# Patient Record
Sex: OTHER/UNKNOWN | URBAN - METROPOLITAN AREA
[De-identification: names, ages, dates, MRNs, and addresses within clinical notes are randomized per-mention and may not be internally consistent; named-entity substitution may affect disease eponyms.]

---

## 2023-01-01 ENCOUNTER — HOSPITAL ENCOUNTER (INPATIENT)
Facility: HOSPITAL | Age: 73
LOS: 1 days | DRG: 871 | End: 2023-12-28
Attending: STUDENT IN AN ORGANIZED HEALTH CARE EDUCATION/TRAINING PROGRAM | Admitting: STUDENT IN AN ORGANIZED HEALTH CARE EDUCATION/TRAINING PROGRAM

## 2023-01-01 VITALS
DIASTOLIC BLOOD PRESSURE: 56 MMHG | WEIGHT: 275 LBS | SYSTOLIC BLOOD PRESSURE: 116 MMHG | TEMPERATURE: 88 F | RESPIRATION RATE: 14 BRPM | OXYGEN SATURATION: 62 %

## 2023-01-01 DIAGNOSIS — K72.01 ACUTE LIVER FAILURE WITH HEPATIC COMA: ICD-10-CM

## 2023-01-01 DIAGNOSIS — R41.89 UNRESPONSIVE: ICD-10-CM

## 2023-01-01 DIAGNOSIS — R00.0 TACHYCARDIA: ICD-10-CM

## 2023-01-01 DIAGNOSIS — I74.10 AORTIC THROMBUS: ICD-10-CM

## 2023-01-01 DIAGNOSIS — Z01.818 ENCOUNTER FOR INTUBATION: ICD-10-CM

## 2023-01-01 DIAGNOSIS — E87.20 METABOLIC ACIDOSIS: ICD-10-CM

## 2023-01-01 DIAGNOSIS — R57.9 SHOCK: ICD-10-CM

## 2023-01-01 DIAGNOSIS — I26.99 ACUTE PULMONARY EMBOLISM, UNSPECIFIED PULMONARY EMBOLISM TYPE, UNSPECIFIED WHETHER ACUTE COR PULMONALE PRESENT: ICD-10-CM

## 2023-01-01 DIAGNOSIS — I71.02 DISSECTION OF ABDOMINAL AORTA: ICD-10-CM

## 2023-01-01 DIAGNOSIS — I46.9 CARDIAC ARREST: Primary | ICD-10-CM

## 2023-01-01 LAB
ABO + RH BLD: NORMAL
ABO GROUP BLD: NORMAL
ALBUMIN SERPL BCP-MCNC: 1 G/DL (ref 3.5–5.2)
ALLENS TEST: ABNORMAL
ALP SERPL-CCNC: 279 U/L (ref 55–135)
ALT SERPL W/O P-5'-P-CCNC: 515 U/L (ref 10–44)
AMMONIA PLAS-SCNC: 458 UMOL/L (ref 10–50)
ANION GAP SERPL CALC-SCNC: 24 MMOL/L (ref 8–16)
ANISOCYTOSIS BLD QL SMEAR: SLIGHT
APAP SERPL-MCNC: <3 UG/ML (ref 10–20)
APTT PPP: >150 SEC (ref 21–32)
AST SERPL-CCNC: 851 U/L (ref 10–40)
AV INDEX (PROSTH): 0.83
AV MEAN GRADIENT: 4 MMHG
AV PEAK GRADIENT: 10 MMHG
AV VALVE AREA BY VELOCITY RATIO: 2.59 CM²
AV VALVE AREA: 2.82 CM²
AV VELOCITY RATIO: 0.76
BASOPHILS NFR BLD: 0 % (ref 0–1.9)
BASOPHILS NFR BLD: 1 % (ref 0–1.9)
BILIRUB DIRECT SERPL-MCNC: 8 MG/DL (ref 0.1–0.3)
BILIRUB SERPL-MCNC: 13.9 MG/DL (ref 0.1–1)
BLD GP AB SCN CELLS X3 SERPL QL: NORMAL
BLD GP AB SCN CELLS X3 SERPL QL: NORMAL
BLD PROD TYP BPU: NORMAL
BLOOD UNIT EXPIRATION DATE: NORMAL
BLOOD UNIT TYPE CODE: 6200
BLOOD UNIT TYPE CODE: 9500
BLOOD UNIT TYPE CODE: 9500
BLOOD UNIT TYPE: NORMAL
BNP SERPL-MCNC: 149 PG/ML (ref 0–99)
BUN SERPL-MCNC: 36 MG/DL (ref 8–23)
BUN SERPL-MCNC: 42 MG/DL (ref 6–30)
BURR CELLS BLD QL SMEAR: ABNORMAL
CA-I BLDV-SCNC: 1.03 MMOL/L (ref 1.06–1.42)
CALCIUM SERPL-MCNC: 9.5 MG/DL (ref 8.7–10.5)
CHLORIDE SERPL-SCNC: 107 MMOL/L (ref 95–110)
CHLORIDE SERPL-SCNC: 107 MMOL/L (ref 95–110)
CK SERPL-CCNC: 511 U/L (ref 20–200)
CO2 SERPL-SCNC: 6 MMOL/L (ref 23–29)
CODING SYSTEM: NORMAL
CREAT SERPL-MCNC: 1.7 MG/DL (ref 0.5–1.4)
CREAT SERPL-MCNC: 2 MG/DL (ref 0.5–1.4)
CROSSMATCH INTERPRETATION: NORMAL
CV ECHO LV RWT: 0.35 CM
DACRYOCYTES BLD QL SMEAR: ABNORMAL
DIFFERENTIAL METHOD: ABNORMAL
DISPENSE STATUS: NORMAL
DOHLE BOD BLD QL SMEAR: PRESENT
DOHLE BOD BLD QL SMEAR: PRESENT
DOP CALC AO PEAK VEL: 1.6 M/S
DOP CALC AO VTI: 32.76 CM
DOP CALC LVOT AREA: 3.4 CM2
DOP CALC LVOT DIAMETER: 2.08 CM
DOP CALC LVOT PEAK VEL: 1.22 M/S
DOP CALC LVOT STROKE VOLUME: 92.34 CM3
DOP CALCLVOT PEAK VEL VTI: 27.19 CM
E WAVE DECELERATION TIME: 375.19 MSEC
E/A RATIO: 0.83
E/E' RATIO: 10.91 M/S
ECHO LV POSTERIOR WALL: 0.95 CM (ref 0.6–1.1)
EOSINOPHIL NFR BLD: 0 % (ref 0–8)
EOSINOPHIL NFR BLD: 0 % (ref 0–8)
EOSINOPHIL NFR BLD: 1 % (ref 0–8)
EOSINOPHIL NFR BLD: 1 % (ref 0–8)
ERYTHROCYTE [DISTWIDTH] IN BLOOD BY AUTOMATED COUNT: 20.3 % (ref 11.5–14.5)
ERYTHROCYTE [DISTWIDTH] IN BLOOD BY AUTOMATED COUNT: 24.6 % (ref 11.5–14.5)
ERYTHROCYTE [DISTWIDTH] IN BLOOD BY AUTOMATED COUNT: 29.2 % (ref 11.5–14.5)
ERYTHROCYTE [DISTWIDTH] IN BLOOD BY AUTOMATED COUNT: 29.2 % (ref 11.5–14.5)
EST. GFR  (NO RACE VARIABLE): 42.6 ML/MIN/1.73 M^2
ETHANOL SERPL-MCNC: <10 MG/DL
FIBRINOGEN PPP-MCNC: <70 MG/DL (ref 182–400)
FIBRINOGEN PPP-MCNC: <70 MG/DL (ref 182–400)
FRACTIONAL SHORTENING: 29 % (ref 28–44)
GLUCOSE SERPL-MCNC: 125 MG/DL (ref 70–110)
GLUCOSE SERPL-MCNC: 165 MG/DL (ref 70–110)
HAPTOGLOB SERPL-MCNC: 21 MG/DL (ref 30–250)
HCO3 UR-SCNC: 5.2 MMOL/L (ref 24–28)
HCO3 UR-SCNC: 7.3 MMOL/L (ref 24–28)
HCO3 UR-SCNC: 7.8 MMOL/L (ref 24–28)
HCT VFR BLD AUTO: 10.6 % (ref 40–54)
HCT VFR BLD AUTO: 11.6 % (ref 40–54)
HCT VFR BLD AUTO: 14.6 % (ref 40–54)
HCT VFR BLD AUTO: 20.2 % (ref 40–54)
HCT VFR BLD CALC: 27 %PCV (ref 36–54)
HCV AB SERPL QL IA: NORMAL
HGB BLD-MCNC: 3.1 G/DL (ref 14–18)
HGB BLD-MCNC: 3.4 G/DL (ref 14–18)
HGB BLD-MCNC: 4.6 G/DL (ref 14–18)
HGB BLD-MCNC: 5.9 G/DL (ref 14–18)
HIV 1+2 AB+HIV1 P24 AG SERPL QL IA: NORMAL
HYPOCHROMIA BLD QL SMEAR: ABNORMAL
IMM GRANULOCYTES # BLD AUTO: ABNORMAL K/UL (ref 0–0.04)
IMM GRANULOCYTES NFR BLD AUTO: ABNORMAL % (ref 0–0.5)
INR PPP: 2.4 (ref 0.8–1.2)
INR PPP: 3.2 (ref 0.8–1.2)
INR PPP: 3.5 (ref 0.8–1.2)
INTERVENTRICULAR SEPTUM: 1.09 CM (ref 0.6–1.1)
IVRT: 156.04 MSEC
LA MAJOR: 4.67 CM
LA MINOR: 4.48 CM
LA WIDTH: 4.04 CM
LACTATE SERPL-SCNC: >12 MMOL/L (ref 0.5–2.2)
LACTATE SERPL-SCNC: >12 MMOL/L (ref 0.5–2.2)
LDH SERPL L TO P-CCNC: 18.14 MMOL/L (ref 0.5–2.2)
LDH SERPL L TO P-CCNC: 18.97 MMOL/L (ref 0.5–2.2)
LDH SERPL L TO P-CCNC: 5325 U/L (ref 110–260)
LEFT ATRIUM SIZE: 3.23 CM
LEFT ATRIUM VOLUME MOD: 60.29 CM3
LEFT ATRIUM VOLUME: 50.72 CM3
LEFT INTERNAL DIMENSION IN SYSTOLE: 3.85 CM (ref 2.1–4)
LEFT VENTRICLE DIASTOLIC VOLUME: 128.34 ML
LEFT VENTRICLE SYSTOLIC VOLUME: 63.78 ML
LEFT VENTRICULAR INTERNAL DIMENSION IN DIASTOLE: 5.4 CM (ref 3.5–6)
LEFT VENTRICULAR MASS: 212.23 G
LIPASE SERPL-CCNC: 37 U/L (ref 4–60)
LV LATERAL E/E' RATIO: 8.57 M/S
LV SEPTAL E/E' RATIO: 15 M/S
LYMPHOCYTES NFR BLD: 13 % (ref 18–48)
LYMPHOCYTES NFR BLD: 24 % (ref 18–48)
LYMPHOCYTES NFR BLD: 27 % (ref 18–48)
LYMPHOCYTES NFR BLD: 9 % (ref 18–48)
MCH RBC QN AUTO: 29.5 PG (ref 27–31)
MCH RBC QN AUTO: 29.9 PG (ref 27–31)
MCH RBC QN AUTO: 30.1 PG (ref 27–31)
MCH RBC QN AUTO: 31.2 PG (ref 27–31)
MCHC RBC AUTO-ENTMCNC: 29.2 G/DL (ref 32–36)
MCHC RBC AUTO-ENTMCNC: 29.2 G/DL (ref 32–36)
MCHC RBC AUTO-ENTMCNC: 29.3 G/DL (ref 32–36)
MCHC RBC AUTO-ENTMCNC: 31.5 G/DL (ref 32–36)
MCV RBC AUTO: 101 FL (ref 82–98)
MCV RBC AUTO: 103 FL (ref 82–98)
MCV RBC AUTO: 106 FL (ref 82–98)
MCV RBC AUTO: 95 FL (ref 82–98)
METAMYELOCYTES NFR BLD MANUAL: 4 %
METAMYELOCYTES NFR BLD MANUAL: 4 %
METAMYELOCYTES NFR BLD MANUAL: 6 %
METAMYELOCYTES NFR BLD MANUAL: 7 %
MONOCYTES NFR BLD: 0 % (ref 4–15)
MONOCYTES NFR BLD: 0 % (ref 4–15)
MONOCYTES NFR BLD: 1 % (ref 4–15)
MONOCYTES NFR BLD: 1 % (ref 4–15)
MV PEAK A VEL: 0.72 M/S
MV PEAK E VEL: 0.6 M/S
MV STENOSIS PRESSURE HALF TIME: 70.5 MS
MV VALVE AREA P 1/2 METHOD: 3.12 CM2
MYELOCYTES NFR BLD MANUAL: 4 %
MYELOCYTES NFR BLD MANUAL: 4 %
NEUTROPHILS NFR BLD: 54 % (ref 38–73)
NEUTROPHILS NFR BLD: 56 % (ref 38–73)
NEUTROPHILS NFR BLD: 72 % (ref 38–73)
NEUTROPHILS NFR BLD: 75 % (ref 38–73)
NEUTS BAND NFR BLD MANUAL: 11 %
NEUTS BAND NFR BLD MANUAL: 7 %
NEUTS BAND NFR BLD MANUAL: 9 %
NEUTS BAND NFR BLD MANUAL: 9 %
NRBC BLD-RTO: 5 /100 WBC
NRBC BLD-RTO: 7 /100 WBC
NRBC BLD-RTO: 7 /100 WBC
NRBC BLD-RTO: 8 /100 WBC
NUM UNITS TRANS PACKED RBC: NORMAL
NUM UNITS TRANS PACKED RBC: NORMAL
PATH REV BLD -IMP: NORMAL
PCO2 BLDA: 33.8 MMHG (ref 35–45)
PCO2 BLDA: 33.8 MMHG (ref 35–45)
PCO2 BLDA: 58.7 MMHG (ref 35–45)
PH SMN: 6.73 [PH] (ref 7.35–7.45)
PH SMN: 6.8 [PH] (ref 7.35–7.45)
PH SMN: 6.94 [PH] (ref 7.35–7.45)
PISA TR MAX VEL: 2.82 M/S
PLATELET # BLD AUTO: 214 K/UL (ref 150–450)
PLATELET # BLD AUTO: 234 K/UL (ref 150–450)
PLATELET # BLD AUTO: 247 K/UL (ref 150–450)
PLATELET # BLD AUTO: 288 K/UL (ref 150–450)
PLATELET BLD QL SMEAR: ABNORMAL
PMV BLD AUTO: 10 FL (ref 9.2–12.9)
PMV BLD AUTO: 10.5 FL (ref 9.2–12.9)
PMV BLD AUTO: 10.9 FL (ref 9.2–12.9)
PMV BLD AUTO: 9.7 FL (ref 9.2–12.9)
PO2 BLDA: 30 MMHG (ref 40–60)
PO2 BLDA: 65 MMHG (ref 40–60)
PO2 BLDA: 66 MMHG (ref 40–60)
POC BE: -25 MMOL/L
POC BE: -28 MMOL/L
POC BE: -29 MMOL/L
POC IONIZED CALCIUM: 1.09 MMOL/L (ref 1.06–1.42)
POC SATURATED O2: 30 % (ref 95–100)
POC SATURATED O2: 64 % (ref 95–100)
POC SATURATED O2: 68 % (ref 95–100)
POC TCO2 (MEASURED): 11 MMOL/L (ref 23–27)
POC TCO2: 10 MMOL/L (ref 24–29)
POC TCO2: 6 MMOL/L (ref 24–29)
POC TCO2: 8 MMOL/L (ref 24–29)
POCT GLUCOSE: >500 MG/DL (ref 70–110)
POIKILOCYTOSIS BLD QL SMEAR: ABNORMAL
POIKILOCYTOSIS BLD QL SMEAR: SLIGHT
POLYCHROMASIA BLD QL SMEAR: ABNORMAL
POTASSIUM BLD-SCNC: 4.4 MMOL/L (ref 3.5–5.1)
POTASSIUM SERPL-SCNC: 5.2 MMOL/L (ref 3.5–5.1)
PROT SERPL-MCNC: 2.5 G/DL (ref 6–8.4)
PROTHROMBIN TIME: 24.7 SEC (ref 9–12.5)
PROTHROMBIN TIME: 32.9 SEC (ref 9–12.5)
PROTHROMBIN TIME: 35 SEC (ref 9–12.5)
RBC # BLD AUTO: 1.03 M/UL (ref 4.6–6.2)
RBC # BLD AUTO: 1.09 M/UL (ref 4.6–6.2)
RBC # BLD AUTO: 1.54 M/UL (ref 4.6–6.2)
RBC # BLD AUTO: 2 M/UL (ref 4.6–6.2)
RETICS/RBC NFR AUTO: 9.6 % (ref 0.4–2)
RH BLD: NORMAL
RIGHT VENTRICULAR END-DIASTOLIC DIMENSION: 3.35 CM
SALICYLATES SERPL-MCNC: <5 MG/DL (ref 15–30)
SAMPLE: ABNORMAL
SCHISTOCYTES BLD QL SMEAR: ABNORMAL
SCHISTOCYTES BLD QL SMEAR: PRESENT
SINUS: 3.5 CM
SITE: ABNORMAL
SODIUM BLD-SCNC: 136 MMOL/L (ref 136–145)
SODIUM SERPL-SCNC: 137 MMOL/L (ref 136–145)
SPECIMEN OUTDATE: NORMAL
SPECIMEN OUTDATE: NORMAL
STJ: 2.9 CM
TDI LATERAL: 0.07 M/S
TDI SEPTAL: 0.04 M/S
TDI: 0.06 M/S
TOXIC GRANULES BLD QL SMEAR: PRESENT
TR MAX PG: 32 MMHG
TRANS ERYTHROCYTES VOL PATIENT: NORMAL ML
TRANS ERYTHROCYTES VOL PATIENT: NORMAL ML
TRICUSPID ANNULAR PLANE SYSTOLIC EXCURSION: 2.27 CM
TROPONIN I SERPL DL<=0.01 NG/ML-MCNC: 0.11 NG/ML (ref 0–0.03)
TROPONIN I SERPL DL<=0.01 NG/ML-MCNC: 0.61 NG/ML (ref 0–0.03)
TROPONIN I SERPL DL<=0.01 NG/ML-MCNC: 2.06 NG/ML (ref 0–0.03)
UNIT NUMBER: NORMAL
WBC # BLD AUTO: 11.86 K/UL (ref 3.9–12.7)
WBC # BLD AUTO: 21.88 K/UL (ref 3.9–12.7)
WBC # BLD AUTO: 21.96 K/UL (ref 3.9–12.7)
WBC # BLD AUTO: 22.55 K/UL (ref 3.9–12.7)
WBC TOXIC VACUOLES BLD QL SMEAR: PRESENT

## 2023-01-01 PROCEDURE — 86920 COMPATIBILITY TEST SPIN: CPT

## 2023-01-01 PROCEDURE — 83615 LACTATE (LD) (LDH) ENZYME: CPT

## 2023-01-01 PROCEDURE — 85007 BL SMEAR W/DIFF WBC COUNT: CPT | Performed by: STUDENT IN AN ORGANIZED HEALTH CARE EDUCATION/TRAINING PROGRAM

## 2023-01-01 PROCEDURE — 85007 BL SMEAR W/DIFF WBC COUNT: CPT | Mod: 91 | Performed by: INTERNAL MEDICINE

## 2023-01-01 PROCEDURE — 80143 DRUG ASSAY ACETAMINOPHEN: CPT

## 2023-01-01 PROCEDURE — P9016 RBC LEUKOCYTES REDUCED: HCPCS | Performed by: STUDENT IN AN ORGANIZED HEALTH CARE EDUCATION/TRAINING PROGRAM

## 2023-01-01 PROCEDURE — 25500020 PHARM REV CODE 255: Performed by: STUDENT IN AN ORGANIZED HEALTH CARE EDUCATION/TRAINING PROGRAM

## 2023-01-01 PROCEDURE — 63600175 PHARM REV CODE 636 W HCPCS: Performed by: STUDENT IN AN ORGANIZED HEALTH CARE EDUCATION/TRAINING PROGRAM

## 2023-01-01 PROCEDURE — 82140 ASSAY OF AMMONIA: CPT

## 2023-01-01 PROCEDURE — 36430 TRANSFUSION BLD/BLD COMPNT: CPT

## 2023-01-01 PROCEDURE — 25000003 PHARM REV CODE 250

## 2023-01-01 PROCEDURE — 85610 PROTHROMBIN TIME: CPT | Mod: 91

## 2023-01-01 PROCEDURE — 85384 FIBRINOGEN ACTIVITY: CPT | Performed by: STUDENT IN AN ORGANIZED HEALTH CARE EDUCATION/TRAINING PROGRAM

## 2023-01-01 PROCEDURE — 36620 INSERTION CATHETER ARTERY: CPT

## 2023-01-01 PROCEDURE — 82248 BILIRUBIN DIRECT: CPT

## 2023-01-01 PROCEDURE — P9021 RED BLOOD CELLS UNIT: HCPCS

## 2023-01-01 PROCEDURE — 80053 COMPREHEN METABOLIC PANEL: CPT | Performed by: STUDENT IN AN ORGANIZED HEALTH CARE EDUCATION/TRAINING PROGRAM

## 2023-01-01 PROCEDURE — 83605 ASSAY OF LACTIC ACID: CPT

## 2023-01-01 PROCEDURE — 99291 CRITICAL CARE FIRST HOUR: CPT | Mod: 25,,, | Performed by: INTERNAL MEDICINE

## 2023-01-01 PROCEDURE — 83605 ASSAY OF LACTIC ACID: CPT | Mod: 91 | Performed by: INTERNAL MEDICINE

## 2023-01-01 PROCEDURE — 84132 ASSAY OF SERUM POTASSIUM: CPT

## 2023-01-01 PROCEDURE — 82330 ASSAY OF CALCIUM: CPT

## 2023-01-01 PROCEDURE — 82803 BLOOD GASES ANY COMBINATION: CPT

## 2023-01-01 PROCEDURE — 84484 ASSAY OF TROPONIN QUANT: CPT

## 2023-01-01 PROCEDURE — 83010 ASSAY OF HAPTOGLOBIN QUANT: CPT

## 2023-01-01 PROCEDURE — 80179 DRUG ASSAY SALICYLATE: CPT

## 2023-01-01 PROCEDURE — 92950 HEART/LUNG RESUSCITATION CPR: CPT

## 2023-01-01 PROCEDURE — 84295 ASSAY OF SERUM SODIUM: CPT

## 2023-01-01 PROCEDURE — 93010 ELECTROCARDIOGRAM REPORT: CPT | Mod: ,,, | Performed by: INTERNAL MEDICINE

## 2023-01-01 PROCEDURE — 86920 COMPATIBILITY TEST SPIN: CPT | Performed by: STUDENT IN AN ORGANIZED HEALTH CARE EDUCATION/TRAINING PROGRAM

## 2023-01-01 PROCEDURE — 86900 BLOOD TYPING SEROLOGIC ABO: CPT | Performed by: STUDENT IN AN ORGANIZED HEALTH CARE EDUCATION/TRAINING PROGRAM

## 2023-01-01 PROCEDURE — 96368 THER/DIAG CONCURRENT INF: CPT

## 2023-01-01 PROCEDURE — P9035 PLATELET PHERES LEUKOREDUCED: HCPCS | Performed by: STUDENT IN AN ORGANIZED HEALTH CARE EDUCATION/TRAINING PROGRAM

## 2023-01-01 PROCEDURE — 99221 1ST HOSP IP/OBS SF/LOW 40: CPT | Mod: FS,,, | Performed by: SURGERY

## 2023-01-01 PROCEDURE — 36556 INSERT NON-TUNNEL CV CATH: CPT | Mod: RT

## 2023-01-01 PROCEDURE — 86901 BLOOD TYPING SEROLOGIC RH(D): CPT | Performed by: STUDENT IN AN ORGANIZED HEALTH CARE EDUCATION/TRAINING PROGRAM

## 2023-01-01 PROCEDURE — 96365 THER/PROPH/DIAG IV INF INIT: CPT | Mod: 59

## 2023-01-01 PROCEDURE — 85007 BL SMEAR W/DIFF WBC COUNT: CPT | Mod: 91 | Performed by: STUDENT IN AN ORGANIZED HEALTH CARE EDUCATION/TRAINING PROGRAM

## 2023-01-01 PROCEDURE — 85027 COMPLETE CBC AUTOMATED: CPT | Mod: 91 | Performed by: STUDENT IN AN ORGANIZED HEALTH CARE EDUCATION/TRAINING PROGRAM

## 2023-01-01 PROCEDURE — 93010 EKG 12-LEAD: ICD-10-PCS | Mod: ,,, | Performed by: INTERNAL MEDICINE

## 2023-01-01 PROCEDURE — 20000000 HC ICU ROOM

## 2023-01-01 PROCEDURE — 87040 BLOOD CULTURE FOR BACTERIA: CPT | Mod: 59 | Performed by: STUDENT IN AN ORGANIZED HEALTH CARE EDUCATION/TRAINING PROGRAM

## 2023-01-01 PROCEDURE — 94761 N-INVAS EAR/PLS OXIMETRY MLT: CPT | Mod: XB

## 2023-01-01 PROCEDURE — 85730 THROMBOPLASTIN TIME PARTIAL: CPT

## 2023-01-01 PROCEDURE — 99221 PR INITIAL HOSPITAL CARE,LEVL I: ICD-10-PCS | Mod: FS,,, | Performed by: SURGERY

## 2023-01-01 PROCEDURE — 93005 ELECTROCARDIOGRAM TRACING: CPT

## 2023-01-01 PROCEDURE — 37799 UNLISTED PX VASCULAR SURGERY: CPT

## 2023-01-01 PROCEDURE — 85610 PROTHROMBIN TIME: CPT

## 2023-01-01 PROCEDURE — 82800 BLOOD PH: CPT

## 2023-01-01 PROCEDURE — 83880 ASSAY OF NATRIURETIC PEPTIDE: CPT

## 2023-01-01 PROCEDURE — 99900026 HC AIRWAY MAINTENANCE (STAT)

## 2023-01-01 PROCEDURE — 25000003 PHARM REV CODE 250: Performed by: STUDENT IN AN ORGANIZED HEALTH CARE EDUCATION/TRAINING PROGRAM

## 2023-01-01 PROCEDURE — 99285 EMERGENCY DEPT VISIT HI MDM: CPT | Mod: 25

## 2023-01-01 PROCEDURE — 99223 1ST HOSP IP/OBS HIGH 75: CPT | Mod: FS,,, | Performed by: STUDENT IN AN ORGANIZED HEALTH CARE EDUCATION/TRAINING PROGRAM

## 2023-01-01 PROCEDURE — 86901 BLOOD TYPING SEROLOGIC RH(D): CPT | Mod: 91 | Performed by: STUDENT IN AN ORGANIZED HEALTH CARE EDUCATION/TRAINING PROGRAM

## 2023-01-01 PROCEDURE — 51702 INSERT TEMP BLADDER CATH: CPT

## 2023-01-01 PROCEDURE — 83690 ASSAY OF LIPASE: CPT

## 2023-01-01 PROCEDURE — 85027 COMPLETE CBC AUTOMATED: CPT | Performed by: STUDENT IN AN ORGANIZED HEALTH CARE EDUCATION/TRAINING PROGRAM

## 2023-01-01 PROCEDURE — 86803 HEPATITIS C AB TEST: CPT | Performed by: STUDENT IN AN ORGANIZED HEALTH CARE EDUCATION/TRAINING PROGRAM

## 2023-01-01 PROCEDURE — 85027 COMPLETE CBC AUTOMATED: CPT | Mod: 91 | Performed by: INTERNAL MEDICINE

## 2023-01-01 PROCEDURE — 99238 HOSP IP/OBS DSCHRG MGMT 30/<: CPT | Mod: ,,, | Performed by: NURSE PRACTITIONER

## 2023-01-01 PROCEDURE — 94002 VENT MGMT INPAT INIT DAY: CPT

## 2023-01-01 PROCEDURE — 99238 PR HOSPITAL DISCHARGE DAY,<30 MIN: ICD-10-PCS | Mod: ,,, | Performed by: NURSE PRACTITIONER

## 2023-01-01 PROCEDURE — 63600175 PHARM REV CODE 636 W HCPCS

## 2023-01-01 PROCEDURE — 99900035 HC TECH TIME PER 15 MIN (STAT)

## 2023-01-01 PROCEDURE — 82077 ASSAY SPEC XCP UR&BREATH IA: CPT

## 2023-01-01 PROCEDURE — 27100171 HC OXYGEN HIGH FLOW UP TO 24 HOURS

## 2023-01-01 PROCEDURE — 31500 INSERT EMERGENCY AIRWAY: CPT

## 2023-01-01 PROCEDURE — 85384 FIBRINOGEN ACTIVITY: CPT | Mod: 91 | Performed by: INTERNAL MEDICINE

## 2023-01-01 PROCEDURE — C9113 INJ PANTOPRAZOLE SODIUM, VIA: HCPCS | Performed by: STUDENT IN AN ORGANIZED HEALTH CARE EDUCATION/TRAINING PROGRAM

## 2023-01-01 PROCEDURE — 85014 HEMATOCRIT: CPT

## 2023-01-01 PROCEDURE — 84484 ASSAY OF TROPONIN QUANT: CPT | Mod: 91

## 2023-01-01 PROCEDURE — 99291 PR CRITICAL CARE, E/M 30-74 MINUTES: ICD-10-PCS | Mod: 25,,, | Performed by: INTERNAL MEDICINE

## 2023-01-01 PROCEDURE — P9017 PLASMA 1 DONOR FRZ W/IN 8 HR: HCPCS | Performed by: STUDENT IN AN ORGANIZED HEALTH CARE EDUCATION/TRAINING PROGRAM

## 2023-01-01 PROCEDURE — 83036 HEMOGLOBIN GLYCOSYLATED A1C: CPT | Performed by: STUDENT IN AN ORGANIZED HEALTH CARE EDUCATION/TRAINING PROGRAM

## 2023-01-01 PROCEDURE — 87389 HIV-1 AG W/HIV-1&-2 AB AG IA: CPT | Performed by: STUDENT IN AN ORGANIZED HEALTH CARE EDUCATION/TRAINING PROGRAM

## 2023-01-01 PROCEDURE — 99223 PR INITIAL HOSPITAL CARE,LEVL III: ICD-10-PCS | Mod: FS,,, | Performed by: STUDENT IN AN ORGANIZED HEALTH CARE EDUCATION/TRAINING PROGRAM

## 2023-01-01 PROCEDURE — 82550 ASSAY OF CK (CPK): CPT | Performed by: NURSE PRACTITIONER

## 2023-01-01 PROCEDURE — 96366 THER/PROPH/DIAG IV INF ADDON: CPT

## 2023-01-01 PROCEDURE — 85045 AUTOMATED RETICULOCYTE COUNT: CPT

## 2023-01-01 PROCEDURE — 63600175 PHARM REV CODE 636 W HCPCS: Performed by: INTERNAL MEDICINE

## 2023-01-01 RX ORDER — SODIUM BICARBONATE 1 MEQ/ML
SYRINGE (ML) INTRAVENOUS CODE/TRAUMA/SEDATION MEDICATION
Status: COMPLETED | OUTPATIENT
Start: 2023-01-01 | End: 2023-01-01

## 2023-01-01 RX ORDER — HYDROCODONE BITARTRATE AND ACETAMINOPHEN 500; 5 MG/1; MG/1
TABLET ORAL CONTINUOUS
Status: DISCONTINUED | OUTPATIENT
Start: 2023-01-01 | End: 2023-12-29 | Stop reason: HOSPADM

## 2023-01-01 RX ORDER — INSULIN ASPART 100 [IU]/ML
0-10 INJECTION, SOLUTION INTRAVENOUS; SUBCUTANEOUS EVERY 6 HOURS PRN
Status: DISCONTINUED | OUTPATIENT
Start: 2023-01-01 | End: 2023-12-29 | Stop reason: HOSPADM

## 2023-01-01 RX ORDER — CALCIUM CHLORIDE INJECTION 100 MG/ML
1 INJECTION, SOLUTION INTRAVENOUS ONCE
Status: COMPLETED | OUTPATIENT
Start: 2023-01-01 | End: 2023-01-01

## 2023-01-01 RX ORDER — MORPHINE SULFATE 2 MG/ML
2 INJECTION, SOLUTION INTRAMUSCULAR; INTRAVENOUS EVERY 4 HOURS PRN
Status: DISCONTINUED | OUTPATIENT
Start: 2023-01-01 | End: 2023-12-29 | Stop reason: HOSPADM

## 2023-01-01 RX ORDER — ROCURONIUM BROMIDE 10 MG/ML
100 INJECTION, SOLUTION INTRAVENOUS ONCE
Status: COMPLETED | OUTPATIENT
Start: 2023-01-01 | End: 2023-01-01

## 2023-01-01 RX ORDER — EPINEPHRINE 0.1 MG/ML
INJECTION INTRAVENOUS CODE/TRAUMA/SEDATION MEDICATION
Status: COMPLETED | OUTPATIENT
Start: 2023-01-01 | End: 2023-01-01

## 2023-01-01 RX ORDER — PANTOPRAZOLE SODIUM 40 MG/10ML
40 INJECTION, POWDER, LYOPHILIZED, FOR SOLUTION INTRAVENOUS DAILY
Status: DISCONTINUED | OUTPATIENT
Start: 2023-01-01 | End: 2023-01-01

## 2023-01-01 RX ORDER — ROCURONIUM BROMIDE 10 MG/ML
INJECTION, SOLUTION INTRAVENOUS
Status: COMPLETED
Start: 2023-01-01 | End: 2023-01-01

## 2023-01-01 RX ORDER — ETOMIDATE 2 MG/ML
INJECTION INTRAVENOUS
Status: COMPLETED
Start: 2023-01-01 | End: 2023-01-01

## 2023-01-01 RX ORDER — SODIUM CHLORIDE 0.9 % (FLUSH) 0.9 %
10 SYRINGE (ML) INJECTION
Status: DISCONTINUED | OUTPATIENT
Start: 2023-01-01 | End: 2023-12-29 | Stop reason: HOSPADM

## 2023-01-01 RX ORDER — CALCIUM CHLORIDE INJECTION 100 MG/ML
INJECTION, SOLUTION INTRAVENOUS CODE/TRAUMA/SEDATION MEDICATION
Status: COMPLETED | OUTPATIENT
Start: 2023-01-01 | End: 2023-01-01

## 2023-01-01 RX ORDER — EPINEPHRINE 1 MG/ML
INJECTION, SOLUTION, CONCENTRATE INTRAVENOUS
Status: DISCONTINUED
Start: 2023-01-01 | End: 2023-12-29 | Stop reason: HOSPADM

## 2023-01-01 RX ORDER — NOREPINEPHRINE BITARTRATE/D5W 4MG/250ML
0-3 PLASTIC BAG, INJECTION (ML) INTRAVENOUS CONTINUOUS
Status: DISCONTINUED | OUTPATIENT
Start: 2023-01-01 | End: 2023-01-01

## 2023-01-01 RX ORDER — MUPIROCIN 20 MG/G
OINTMENT TOPICAL 2 TIMES DAILY
Status: DISCONTINUED | OUTPATIENT
Start: 2023-01-01 | End: 2023-12-29 | Stop reason: HOSPADM

## 2023-01-01 RX ORDER — HYDROCODONE BITARTRATE AND ACETAMINOPHEN 500; 5 MG/1; MG/1
TABLET ORAL
Status: DISCONTINUED | OUTPATIENT
Start: 2023-01-01 | End: 2023-12-29 | Stop reason: HOSPADM

## 2023-01-01 RX ORDER — FENTANYL CITRATE-0.9 % NACL/PF 10 MCG/ML
0-200 PLASTIC BAG, INJECTION (ML) INTRAVENOUS CONTINUOUS
Status: DISCONTINUED | OUTPATIENT
Start: 2023-01-01 | End: 2023-12-29 | Stop reason: HOSPADM

## 2023-01-01 RX ORDER — SODIUM BICARBONATE 1 MEQ/ML
50 SYRINGE (ML) INTRAVENOUS
Status: COMPLETED | OUTPATIENT
Start: 2023-01-01 | End: 2023-01-01

## 2023-01-01 RX ORDER — NOREPINEPHRINE BITARTRATE/D5W 4MG/250ML
PLASTIC BAG, INJECTION (ML) INTRAVENOUS
Status: COMPLETED
Start: 2023-01-01 | End: 2023-01-01

## 2023-01-01 RX ORDER — MAGNESIUM SULFATE HEPTAHYDRATE 40 MG/ML
2 INJECTION, SOLUTION INTRAVENOUS
Status: DISCONTINUED | OUTPATIENT
Start: 2023-01-01 | End: 2023-12-29 | Stop reason: HOSPADM

## 2023-01-01 RX ORDER — LORAZEPAM 2 MG/ML
2 INJECTION INTRAMUSCULAR
Status: DISCONTINUED | OUTPATIENT
Start: 2023-01-01 | End: 2023-12-29 | Stop reason: HOSPADM

## 2023-01-01 RX ORDER — CHLORHEXIDINE GLUCONATE ORAL RINSE 1.2 MG/ML
15 SOLUTION DENTAL 2 TIMES DAILY
Status: DISCONTINUED | OUTPATIENT
Start: 2023-01-01 | End: 2023-12-29 | Stop reason: HOSPADM

## 2023-01-01 RX ORDER — PROPOFOL 10 MG/ML
0-50 INJECTION, EMULSION INTRAVENOUS CONTINUOUS
Status: DISCONTINUED | OUTPATIENT
Start: 2023-01-01 | End: 2023-12-29 | Stop reason: HOSPADM

## 2023-01-01 RX ORDER — GLUCAGON 1 MG
1 KIT INJECTION
Status: DISCONTINUED | OUTPATIENT
Start: 2023-01-01 | End: 2023-12-29 | Stop reason: HOSPADM

## 2023-01-01 RX ORDER — INDOMETHACIN 25 MG/1
CAPSULE ORAL
Status: COMPLETED
Start: 2023-01-01 | End: 2023-01-01

## 2023-01-01 RX ORDER — PANTOPRAZOLE SODIUM 40 MG/10ML
80 INJECTION, POWDER, LYOPHILIZED, FOR SOLUTION INTRAVENOUS ONCE
Status: COMPLETED | OUTPATIENT
Start: 2023-01-01 | End: 2023-01-01

## 2023-01-01 RX ORDER — PANTOPRAZOLE SODIUM 40 MG/10ML
40 INJECTION, POWDER, LYOPHILIZED, FOR SOLUTION INTRAVENOUS 2 TIMES DAILY
Status: DISCONTINUED | OUTPATIENT
Start: 2023-01-01 | End: 2023-12-29 | Stop reason: HOSPADM

## 2023-01-01 RX ADMIN — ROCURONIUM BROMIDE 100 MG: 10 INJECTION, SOLUTION INTRAVENOUS at 09:12

## 2023-01-01 RX ADMIN — EPINEPHRINE 0.02 MCG/KG/MIN: 1 INJECTION INTRAMUSCULAR; INTRAVENOUS; SUBCUTANEOUS at 09:12

## 2023-01-01 RX ADMIN — SODIUM BICARBONATE: 84 INJECTION, SOLUTION INTRAVENOUS at 11:12

## 2023-01-01 RX ADMIN — SODIUM BICARBONATE 50 MEQ: 84 INJECTION, SOLUTION INTRAVENOUS at 11:12

## 2023-01-01 RX ADMIN — VASOPRESSIN 0.04 UNITS/MIN: 20 INJECTION INTRAVENOUS at 01:12

## 2023-01-01 RX ADMIN — NOREPINEPHRINE BITARTRATE 1.9 MCG/KG/MIN: 1 INJECTION, SOLUTION, CONCENTRATE INTRAVENOUS at 02:12

## 2023-01-01 RX ADMIN — EPINEPHRINE 1 MG: 0.1 INJECTION INTRAVENOUS at 09:12

## 2023-01-01 RX ADMIN — SODIUM BICARBONATE 50 MEQ: 84 INJECTION INTRAVENOUS at 09:12

## 2023-01-01 RX ADMIN — SODIUM CHLORIDE 1000 ML: 9 INJECTION, SOLUTION INTRAVENOUS at 10:12

## 2023-01-01 RX ADMIN — CALCIUM CHLORIDE INJECTION 1 G: 100 INJECTION, SOLUTION INTRAVENOUS at 09:12

## 2023-01-01 RX ADMIN — IOHEXOL 100 ML: 350 INJECTION, SOLUTION INTRAVENOUS at 10:12

## 2023-01-01 RX ADMIN — SODIUM CHLORIDE, POTASSIUM CHLORIDE, SODIUM LACTATE AND CALCIUM CHLORIDE 1000 ML: 600; 310; 30; 20 INJECTION, SOLUTION INTRAVENOUS at 12:12

## 2023-01-01 RX ADMIN — SODIUM CHLORIDE 3 MCG/KG/MIN: 9 INJECTION, SOLUTION INTRAVENOUS at 06:12

## 2023-01-01 RX ADMIN — EPINEPHRINE 2 MCG/KG/MIN: 1 INJECTION INTRAMUSCULAR; INTRAVENOUS; SUBCUTANEOUS at 05:12

## 2023-01-01 RX ADMIN — VANCOMYCIN HYDROCHLORIDE 2500 MG: 1 INJECTION, POWDER, LYOPHILIZED, FOR SOLUTION INTRAVENOUS at 11:12

## 2023-01-01 RX ADMIN — Medication 50 MEQ: at 11:12

## 2023-01-01 RX ADMIN — HYDROCORTISONE SODIUM SUCCINATE 100 MG: 100 INJECTION, POWDER, FOR SOLUTION INTRAMUSCULAR; INTRAVENOUS at 04:12

## 2023-01-01 RX ADMIN — MORPHINE SULFATE 2 MG: 2 INJECTION, SOLUTION INTRAMUSCULAR; INTRAVENOUS at 07:12

## 2023-01-01 RX ADMIN — PIPERACILLIN SODIUM AND TAZOBACTAM SODIUM 3.38 G: 3; .375 INJECTION, POWDER, FOR SOLUTION INTRAVENOUS at 11:12

## 2023-01-01 RX ADMIN — PANTOPRAZOLE SODIUM 80 MG: 40 INJECTION, POWDER, FOR SOLUTION INTRAVENOUS at 04:12

## 2023-01-01 RX ADMIN — EPINEPHRINE 0.02 MCG/KG/MIN: 1 INJECTION INTRAMUSCULAR; INTRAVENOUS; SUBCUTANEOUS at 04:12

## 2023-01-01 RX ADMIN — EPINEPHRINE 2 MCG/KG/MIN: 1 INJECTION INTRAMUSCULAR; INTRAVENOUS; SUBCUTANEOUS at 06:12

## 2023-01-01 RX ADMIN — NOREPINEPHRINE BITARTRATE 0.02 MCG/KG/MIN: 4 INJECTION, SOLUTION INTRAVENOUS at 10:12

## 2023-01-01 RX ADMIN — Medication 0.02 MCG/KG/MIN: at 10:12

## 2023-12-28 PROBLEM — E87.20 METABOLIC ACIDOSIS: Status: ACTIVE | Noted: 2023-01-01

## 2023-12-28 PROBLEM — Z51.5 COMFORT MEASURES ONLY STATUS: Status: ACTIVE | Noted: 2023-01-01

## 2023-12-28 PROBLEM — N17.9 AKI (ACUTE KIDNEY INJURY): Status: ACTIVE | Noted: 2023-01-01

## 2023-12-28 PROBLEM — R57.9 SHOCK: Status: ACTIVE | Noted: 2023-01-01

## 2023-12-28 PROBLEM — D64.9 ANEMIA: Status: ACTIVE | Noted: 2023-01-01

## 2023-12-28 PROBLEM — A41.9 SEVERE SEPSIS: Status: ACTIVE | Noted: 2023-01-01

## 2023-12-28 PROBLEM — I71.012 DISSECTION OF DESCENDING THORACIC AORTA: Status: ACTIVE | Noted: 2023-01-01

## 2023-12-28 PROBLEM — G93.41 ACUTE METABOLIC ENCEPHALOPATHY: Status: ACTIVE | Noted: 2023-01-01

## 2023-12-28 PROBLEM — R65.20 SEVERE SEPSIS: Status: ACTIVE | Noted: 2023-01-01

## 2023-12-28 PROBLEM — D65 CONSUMPTION COAGULOPATHY: Status: ACTIVE | Noted: 2023-01-01

## 2023-12-28 PROBLEM — R74.01 TRANSAMINITIS: Status: ACTIVE | Noted: 2023-01-01

## 2023-12-28 PROBLEM — I46.9 PEA (PULSELESS ELECTRICAL ACTIVITY): Status: ACTIVE | Noted: 2023-01-01

## 2023-12-28 PROBLEM — N17.0 ACUTE RENAL FAILURE WITH TUBULAR NECROSIS: Status: ACTIVE | Noted: 2023-01-01

## 2023-12-28 PROBLEM — R79.1 ELEVATED INR: Status: ACTIVE | Noted: 2023-01-01

## 2023-12-28 PROBLEM — I74.3 ARTERIAL EMBOLISM AND THROMBOSIS OF LOWER EXTREMITY: Status: ACTIVE | Noted: 2023-01-01

## 2023-12-28 PROBLEM — I26.99 ACUTE PULMONARY EMBOLISM: Status: ACTIVE | Noted: 2023-01-01

## 2023-12-28 NOTE — ASSESSMENT & PLAN NOTE
pH 6.733 at time of admission. Bicarb 6. Suspect secondary to shock and significant lactic acidosis. Likely driven by ischemia given significant clot burden as well. Bicarb gtt initiated. Will plan for emergent hemodialysis.    -- Nephrology consulted for emergent HD  -- Bicarb gtt  -- Trend q4h BMP  -- ABG PRN

## 2023-12-28 NOTE — ASSESSMENT & PLAN NOTE
Undifferentiated shock on admission. Hemorrhagic vs septic vs cardiogenic vs obstructive (2/2 PE). Cold extremities on exam. Fluid resuscitated in ED with 2L IV fluids, 2 units PRBC. Pressors started. Lactate 18 on admission. Will support broadly given unclear etiology. Broad spectrum antibiotics, vasopressor support.    -- Maintain Map goal > 65  -- IV vancomycin and zosyn  -- Follow-up stat TTE  -- IV hydrocortisone 100 mg TID

## 2023-12-28 NOTE — CONSULTS
Consult Note    Consult received. Patient to be admitted to the MICU. Full H&P to follow.    Khanh Rodriguez MD  Critical Care Medicine  12/28/2023   12:16 PM

## 2023-12-28 NOTE — ASSESSMENT & PLAN NOTE
Patient reportedly encephalopathic, bradycardic, eventually PEA en route to hospital with EMS. CPR initiated, ROSC obtained after five rounds of compressions. Transcutaneously paced with native rhythm out-pacing pacemaker. Patient now in sinus rhythm.    -- Manage shock as above  -- Maintain electrolytes WNL

## 2023-12-28 NOTE — PLAN OF CARE
Contacted by MICU team regarding mgmt of bilateral segmental PEs in critically-ill patient. Patient presenting with encephalopathy and deteriorating in to respiratory failure and bradycardia requiring pacing and ultimately PEA cardiac arrest. ROSC after 5 rounds of CPR + meds and intubated. Work-up revealing descending aortic dissection for which vascular surgery recommended medical mgmt. Pulmonary emboli found on CTA chest/abd/pelvis. Contraindication to thrombolytics given descending aortic dissection. Also noted to have arterial filling defects in peripheral lower extremity arteries consistent with thromboembolic disease. EKG now with NSR and no evidence of AVB. Currently on 2 pressors and full code.     No role for invasive intervention given inability to tolerate anticoagulation and critical illness. Can check TTE for function after cardiac arrest and PE for prognostication. Otherwise would continue supportive care per MICU team and consider palliative care.      Discussed with resident on MICU and asked to call back if any questions.      Edmundo Obregon MD PGY6  Cardiovascular Medicine Fellow  [facility]  Pager: [phone]

## 2023-12-28 NOTE — PROGRESS NOTES
"Pharmacokinetic Initial Assessment: IV Vancomycin    Assessment/Plan:    - Patient received a vancomycin loading dose of 2500 mg in the ED at 11:47.   - SCr on admission was 1.7 md/dL with unknown baseline.  Nephrology has placed orders for continuous SLED.  - No additional vancomycin is required today.   - A random level is ordered with AM labs tomorrow.  Pharmacy to re-dose based on level and RRT plans.  - Desired empiric serum trough concentration is 15 to 20 mcg/mL    Pharmacy will continue to follow and monitor vancomycin.      Please contact pharmacy at extension [phone] with any questions regarding this assessment.     Thank you for the consult,   Patrizia Escobedo, PharmD, BCCCP       Patient brief summary:  Romy Walden is a 73 y.o. adult initiated on antimicrobial therapy with IV Vancomycin for treatment of suspected sepsis    Drug Allergies:   Review of patient's allergies indicates:  Not on File    Actual Body Weight:   124.7 kg    Renal Function:   CrCl cannot be calculated (Unknown ideal weight.).,     Dialysis Method (if applicable):  SLED    CBC (last 72 hours):  Recent Labs   Lab Result Units 12/28/23  1051 12/28/23  1147   WBC K/uL 11.86 21.88*   Hemoglobin g/dL 3.1* 3.4*   Hematocrit % 10.6* 11.6*   Platelets K/uL 234 288   Gran % % 56.0 54.0   Lymph % % 24.0 27.0   Mono % % 0.0* 0.0*   Eosinophil % % 0.0 1.0   Basophil % % 0.0 1.0   Differential Method  Manual Manual       Metabolic Panel (last 72 hours):  Recent Labs   Lab Result Units 12/28/23  1051   Sodium mmol/L 137   Potassium mmol/L 5.2*   Chloride mmol/L 107   CO2 mmol/L 6*   Glucose mg/dL 125*   BUN mg/dL 36*   Creatinine mg/dL 1.7*   Albumin g/dL 1.0*   Total Bilirubin mg/dL 13.9*   Alkaline Phosphatase U/L 279*   AST U/L 851*   ALT U/L 515*       Drug levels (last 3 results):  No results for input(s): "VANCOMYCINRA", "VANCORANDOM", "VANCOMYCINPE", "VANCOPEAK", "VANCOMYCINTR", "VANCOTROUGH" in the last 72 hours.    Microbiologic " Results:  Microbiology Results (last 7 days)       Procedure Component Value Units Date/Time    Blood culture x two cultures. Draw prior to antibiotics. [[id number]] Collected: 12/28/23 1051    Order Status: Sent Specimen: Blood from Peripheral, Antecubital, Left Updated: 12/28/23 1105    Blood culture x two cultures. Draw prior to antibiotics. [[id number]] Collected: 12/28/23 1050    Order Status: Sent Specimen: Blood from Peripheral, Antecubital, Right Updated: 12/28/23 1101

## 2023-12-28 NOTE — ASSESSMENT & PLAN NOTE
INR 2.4 -> 3.2 on admission. Given concern for active hemorrhage will treat with 1 dose of vitamin K. Risk of worsening of pulmonary emboli recognized. Risks of hemorrhage vs worsening PE considered.  Suspect worsening 2/2 acute liver failure secondary to shock    -- IV vitamin K  -- Trend INR daily

## 2023-12-28 NOTE — EICU
Intervention Initiated From:  [facility] / EICU    Chavez intervened regarding:  Rounding (Video assessment) and Time-Out    Nurse Notified:  Yes    Doctor Notified:  Yes    Comments: timeout done for arterial line

## 2023-12-28 NOTE — ASSESSMENT & PLAN NOTE
"This patient does have evidence of infective focus  My overall impression is septic shock due to lactate > 4 and MAP < 65.  Source: Respiratory, Abdominal, and Unknown  Antibiotics given-   Antibiotics (72h ago, onward)      Start     Stop Route Frequency Ordered    12/28/23 1330  piperacillin-tazobactam (ZOSYN) 4.5 g in dextrose 5 % in water (D5W) 100 mL IVPB (MB+)         -- IV Every 8 hours (non-standard times) 12/28/23 1226    12/28/23 1325  vancomycin - pharmacy to dose  (vancomycin IVPB (PEDS and ADULTS))        See Hyperspace for full Linked Orders Report.    -- IV pharmacy to manage frequency 12/28/23 1226    12/28/23 1030  vancomycin (VANCOCIN) 2,500 mg in dextrose 5 % (D5W) 500 mL IVPB         12/28/23 2229 IV ED 1 Time 12/28/23 1020          Latest lactate reviewed-  No results for input(s): "LACTATE" in the last 72 hours.  Organ dysfunction indicated by Acute kidney injury, Acute liver injury, and Acute respiratory failure    Fluid challenge Ideal Body Weight- The patient's ideal body weight is Height is required to calculate ideal body weight. which will be used to calculate fluid bolus of 30 ml/kg for treatment of septic shock.      Post- resuscitation assessment No - Post resuscitation assessment not needed     Will Start Pressors- Levophed for MAP of 65  Source control achieved by: Vanc + Zosyn  "

## 2023-12-28 NOTE — SUBJECTIVE & OBJECTIVE
No past medical history on file.    No past surgical history on file.    Review of patient's allergies indicates:  Not on File  Current Facility-Administered Medications   Medication Frequency    0.9%  NaCl infusion (CRRT USE ONLY) Continuous    0.9%  NaCl infusion (for blood administration) Q24H PRN    0.9%  NaCl infusion (for blood administration) Q24H PRN    0.9%  NaCl infusion (for blood administration) Q24H PRN    0.9%  NaCl infusion (for blood administration) Q24H PRN    chlorhexidine 0.12 % solution 15 mL BID    fentaNYL 2500 mcg in 0.9% sodium chloride 250 mL infusion premix (titrating) Continuous    hydrocortisone sodium succinate injection 100 mg Q8H    magnesium sulfate 2g in water 50mL IVPB (premix) PRN    mupirocin 2 % ointment BID    NORepinephrine 32 mg in dextrose 5 % (D5W) 250 mL infusion Continuous    NORepinephrine bitartrate-D5W 4 mg/250 mL (16 mcg/mL) infusion Soln     pantoprazole injection 40 mg BID    pantoprazole injection 80 mg Once    phytonadione vitamin k (AQUA-MEPHYTON) 10 mg in dextrose 5 % (D5W) 50 mL IVPB Once    piperacillin-tazobactam (ZOSYN) 4.5 g in dextrose 5 % in water (D5W) 100 mL IVPB (MB+) Q8H    propofol (DIPRIVAN) 10 mg/mL infusion Continuous    sodium bicarbonate 150 mEq in dextrose 5 % (D5W) 1,000 mL infusion Continuous    sodium chloride 0.9% flush 10 mL PRN    sodium phosphate 20.01 mmol in dextrose 5 % (D5W) 250 mL IVPB PRN    sodium phosphate 30 mmol in dextrose 5 % (D5W) 250 mL IVPB PRN    sodium phosphate 39.99 mmol in dextrose 5 % (D5W) 250 mL IVPB PRN    vancomycin - pharmacy to dose pharmacy to manage frequency    vasopressin (PITRESSIN) 0.2 Units/mL in dextrose 5 % (D5W) 100 mL infusion Continuous     Family History    None       Tobacco Use    Smoking status: Not on file    Smokeless tobacco: Not on file   Substance and Sexual Activity    Alcohol use: Not on file    Drug use: Not on file    Sexual activity: Not on file     Review of Systems   Unable to perform  ROS: Intubated     Objective:     Vital Signs (Most Recent):  Temp: (!) 88.3 °F (31.3 °C) (12/28/23 1425)  Pulse: (!) 49 (12/28/23 1443)  Resp: (!) 36 (12/28/23 1443)  BP: (!) 104/51 (12/28/23 1443)  SpO2: (!) 91 % (12/28/23 1425) Vital Signs (24h Range):  Temp:  [88.3 °F (31.3 °C)-94.8 °F (34.9 °C)] 88.3 °F (31.3 °C)  Pulse:  [] 49  Resp:  [7-36] 36  SpO2:  [54 %-99 %] 91 %  BP: ()/(24-69) 104/51     Weight: 124.7 kg (275 lb) (12/28/23 1434)  There is no height or weight on file to calculate BMI.  There is no height or weight on file to calculate BSA.    No intake/output data recorded.     Physical Exam  Vitals and nursing note reviewed.   Constitutional:       Appearance: Romy Walden is morbidly obese. Romy Walden is ill-appearing and toxic-appearing.      Interventions: Romy Walden is sedated and intubated.   HENT:      Head: Atraumatic.      Right Ear: External ear normal.      Left Ear: External ear normal.   Cardiovascular:      Rate and Rhythm: Normal rate and regular rhythm.      Heart sounds: Normal heart sounds.   Pulmonary:      Effort: Romy Walden is intubated.      Breath sounds: Normal breath sounds.   Abdominal:      General: There is distension.   Musculoskeletal:      Right lower leg: No edema.      Left lower leg: No edema.   Skin:     General: Skin is cool.      Coloration: Skin is jaundiced.   Neurological:      Mental Status: Romy Walden is unresponsive.      Comments: Intubated/sedated           Significant Labs:  CBC:   Recent Labs   Lab 12/28/23  1147   WBC 21.88*   RBC 1.09*   HGB 3.4*   HCT 11.6*      *   MCH 31.2*   MCHC 29.3*     CMP:   Recent Labs   Lab 12/28/23  1051   *   CALCIUM 9.5   ALBUMIN 1.0*   PROT 2.5*      K 5.2*   CO2 6*      BUN 36*   CREATININE 1.7*   ALKPHOS 279*   *   *   BILITOT 13.9*     All labs within the past 24 hours have been reviewed.

## 2023-12-28 NOTE — ASSESSMENT & PLAN NOTE
CTA chest/abd/pelvis with: Intimal irregularity concerning for dissection and associated thrombus involving the lower descending thoracic aorta and entirety of the abdominal aorta.  Contour irregularity of the upper abdominal aorta concerning for unstable penetrating aortic ulcer.     Discussed with radiology. No active extravasation noted in ascending or thoracic aorta. Visualization of abdominal aorta adequate to rule out abdominal aortic bleed. Recommended no further benefit from dedicated abdominal contrast timing. Given high suspicion for hemorrhage will evaluate for possible GI bleeding if patient stabilizes enough for endoscopy.    -- Maintain SBP < 120

## 2023-12-28 NOTE — SUBJECTIVE & OBJECTIVE
(Not in a hospital admission)      Review of patient's allergies indicates:  Not on File    No past medical history on file.  No past surgical history on file.  Family History    None       Tobacco Use    Smoking status: Not on file    Smokeless tobacco: Not on file   Substance and Sexual Activity    Alcohol use: Not on file    Drug use: Not on file    Sexual activity: Not on file     Review of Systems   Unable to perform ROS: Intubated (Intubated)     Objective:     Vital Signs (Most Recent):  Temp: (!) 88.5 °F (31.4 °C) (12/28/23 1410)  Pulse: 62 (12/28/23 1410)  Resp: (!) 35 (12/28/23 1410)  BP: (!) 89/51 (12/28/23 1410)  SpO2: (!) 92 % (12/28/23 1410) Vital Signs (24h Range):  Temp:  [88.5 °F (31.4 °C)-94.8 °F (34.9 °C)] 88.5 °F (31.4 °C)  Pulse:  [] 62  Resp:  [7-35] 35  SpO2:  [54 %-99 %] 92 %  BP: ()/(24-69) 89/51     Weight: 125 kg (275 lb 9.2 oz)  There is no height or weight on file to calculate BMI.      Physical Exam  Constitutional:       Appearance: Romy Walden is ill-appearing and toxic-appearing.      Comments: Intubated  Sedated  Not fighting the tube   HENT:      Head: Normocephalic.      Mouth/Throat:      Comments: Endotracheal tube  Eyes:      Comments: closed   Cardiovascular:      Rate and Rhythm: Bradycardia present.      Pulses:           Femoral pulses are 2+ on the right side and 2+ on the left side.  Pulmonary:      Comments: Equal chest rise with ventilation   Skin:     Capillary Refill: Capillary refill takes more than 3 seconds.      Coloration: Skin is jaundiced and pale.      Comments: Jaundice +   Neurological:      Comments: sedated          Significant Labs:  All pertinent labs from the last 24 hours have been reviewed.    Significant Diagnostics:  I have reviewed all pertinent imaging results/findings within the past 24 hours.

## 2023-12-28 NOTE — PLAN OF CARE
Initial Discharge Planning Case Management Assessment:    Patient admitted on 12-28-23  Chart reviewed today  Care plan discussed with treatment team,      Current dispo: tbd  Came in as a Laisha Marcum.  Currently on mechanical ventilator.  Consults following: case mgt, nephrology, vasc surgery  Case management  to follow tomorrow

## 2023-12-28 NOTE — HPI
73 F who is unknown to the [facility] and unresponsive. History is limited due to acuity of the condition. Per chart documentation and ED, patient had a fall earlier today which EMS was called for. She was initially A&Ox4 but became more altered during transport before becoming unresponsive. Upon arrival to the ED, patient became bradycardiac in the 30s and went into PEA cardiac arrest and respiratory failure. Patient underwent 5 rounds of CPR before achieving ROSC. She was intubated. Currently she is on Epinephrine, Norepineprine and Phenylephrine iv infusion. He last Hb was 4.6, pH  6.7.     CTA Abdomen & Pelvis at 10:48 today revealed thrombus in lower descending thoracic aorta and entirety of the abdominal aorta.  Contour irregularity of the upper abdominal aorta concerning for unstable penetrating aortic ulcer. Nonocclusive filling defects extend into the bilateral common, external, and iliac arteries as well as visualized upper femoral arteries. Additionally suspected extension of internal injury and/or thrombus of the proximal left renal artery. Also, acute bilateral pulmonary emboli involving lobar, inter lobar, segmental, and subsegmental branches. Vascular surgery has been consulted for the thrombus in lower descending thoracic and abdominal aorta.

## 2023-12-28 NOTE — CARE UPDATE
Contacted by MICU team regarding mgmt of bilateral segmental PEs in critically-ill patient. Patient presenting with encephalopathy and deteriorating in to respiratory failure and bradycardia requiring pacing and ultimately PEA cardiac arrest. ROSC after 5 rounds of CPR + meds and intubated. Work-up revealing descending aortic dissection for which vascular surgery recommended medical mgmt. Pulmonary emboli found on CTA chest/abd/pelvis. Contraindication to thrombolytics given descending aortic dissection. Also noted to have arterial filling defects in peripheral lower extremity arteries consistent with thromboembolic disease. EKG now with NSR and no evidence of AVB. Currently on 2 pressors and full code.    No role for invasive intervention given inability to tolerate anticoagulation and critical illness. Can check TTE for function after cardiac arrest and PE for prognostication. Otherwise would continue supportive care per MICU team and consider palliative care.     Discussed with resident on MICU and asked to call back if any questions.     Edmundo Obregon MD PGY6  Cardiovascular Medicine Fellow  [facility]  Pager: [phone]

## 2023-12-28 NOTE — ASSESSMENT & PLAN NOTE
CTA chest/abd/pelvis on admission showing: acute appearing bilateral pulmonary emboli involving lobar, inter lobar, segmental, and subsegmental branches.  Question minor flattening of the interventricular septum, as may be seen in setting of right heart strain. Patient intubated in ED. Unfortunately given patient's concurrent type B aortic dissection, Hgb 3 concerning for hemorrhage the patient is not a candidate for anticoagulation. ED discussed with vascular surgery who stated risks of thrombectomy outweighed benefits. Discussed with cardiology who are in agreement with vascular surgery, given patient's contraindication to anticoagulation the patient is not a candidate for thrombectomy at this time. Will manage supportively as able.    -- Mechanical ventilation  -- Unable to anticoagulate given aortic dissection, concern for active bleed

## 2023-12-28 NOTE — ED PROVIDER NOTES
Encounter Date: 12/28/2023       History     Chief Complaint   Patient presents with    Unresponsive     Patient is a 73-year-old female with unknown past medical history that presents to emergency department after a fall this morning.  Per EMS report patient called 911 today after she fell.  On arrival EMS found her to be alert and oriented x4.  They state she had worsening confusion throughout ride to the hospital.  On arrival to the ER she became unresponsive, hypoxic and bradycardic.  Patient shortly had PEA cardiac arrest.  Across was obtained after 5 rounds of CPR, 4 rounds of epi, calcium chloride, to pushes of bicarb.  Patient was bradycardic to 35 after ROSC, started on epi drip and transcutaneous pacing.    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  Patient did have diffuse jaundice across her whole body.        Review of patient's allergies indicates:  Not on File  No past medical history on file.  No past surgical history on file.  No family history on file.     Review of Systems  ROS unable to obtain secondary to patient's clinical condition    Physical Exam     Initial Vitals   BP Pulse Resp Temp SpO2   12/28/23 0951 12/28/23 0951 12/28/23 1000 12/28/23 1050 12/28/23 0951   (!) 45/24 (!) 119 (!) 25 (!) 94.8 °F (34.9 °C) (!) 75 %      MAP       --                Physical Exam    Nursing note and vitals reviewed.  Constitutional: Romy Walden is Obese . Romy Walden appears toxic. Romy Walden has a sickly appearance. Romy Walden appears ill. Romy Walden is intubated.   HENT:   Head: Normocephalic and atraumatic.   Right Ear: External ear normal.   Left Ear: External ear normal.   Nose: Nose normal.   Mouth/Throat: Mucous membranes are dry.   Eyes: Right eye exhibits no discharge. Left eye exhibits no discharge. Scleral icterus is present.   Neck: No JVD  present.   Cardiovascular:  S1 normal and S2 normal.   Bradycardia present.   Exam reveals decreased pulses.       Pulmonary/Chest: Romy Walden is intubated. Romy Walden has rhonchi in the right middle field, the right lower field and the left lower field.   Abdominal: Abdomen is soft. Romy Walden exhibits distension. A hernia is present. Hernia confirmed positive in the umbilical area.   Musculoskeletal:         General: No edema.     Neurological: Romy Walden is unresponsive. GCS eye subscore is 1. GCS verbal subscore is 1. GCS motor subscore is 1.   Skin: No rash noted.   Jaundice          ED Course   Intubation    Date/Time: 12/28/2023 2:28 PM  Location procedure was performed: [facility] EMERGENCY DEPARTMENT    Performed by: Paul Villanueva DO  Authorized by: Latricia Cohen MD  Consent Done: Emergent Situation  Indications: airway protection, respiratory distress, respiratory failure and hypoxemia  Intubation method: video-assisted  Patient status: paralyzed (RSI)  Preoxygenation: BVM  Paralytic: rocuronium  Laryngoscope size: Glide 4  Tube size: 7.5 mm  Tube type: cuffed  Number of attempts: 1  Post-procedure assessment: chest rise and CO2 detector  Breath sounds: rales/crackles  Cuff inflated: yes  ETT to lip: 23 cm  Tube secured with: ETT ortiz  Chest x-ray interpreted by me.  Chest x-ray findings: endotracheal tube in appropriate position      Central Line    Date/Time: 12/28/2023 2:29 PM    Performed by: Paul Villanueva DO  Authorized by: Latricia Cohen MD    Location procedure was performed:  [facility] EMERGENCY DEPARTMENT  Indications:  Med administration, hemodialysis and vascular access  Preparation:  Skin prepped with ChloraPrep  Location:  Right internal jugular  Catheter type:  Trialysis  Catheter size:  13 Fr  Inserted Catheter Length (cm):  15  Ultrasound guidance: Yes    Vessel Caliber:  Medium  Comprressibility:  Normal  Needle advanced into vessel with real time  ultrasound guidance.    Guidewire confirmed in vessel.    Steril sheath on probe.    Sterile gel used.  Manometry: No    Number of attempts:  1  Securement:  Line sutured, chlorhexidine patch, sterile dressing applied and blood return through all ports  Complications: No    Adverse Events:  None  Other Complications:  Verified by with ultrasound with saline flush    Verified by with ultrasound with saline flush Termination Site: superior vena cava    Labs Reviewed   CBC W/ AUTO DIFFERENTIAL - Abnormal; Notable for the following components:       Result Value    RBC 1.03 (*)     Hemoglobin 3.1 (*)     Hematocrit 10.6 (*)      (*)     MCHC 29.2 (*)     RDW 29.2 (*)     nRBC 8 (*)     Mono % 0.0 (*)     All other components within normal limits    Narrative:     Release to patient->Immediate  H&H critical result(s) called and verbal readback obtained from   Latricia Cohen MD & Paul Villanueva DO by MAB3 12/28/2023 11:33   COMPREHENSIVE METABOLIC PANEL - Abnormal; Notable for the following components:    Potassium 5.2 (*)     CO2 6 (*)     Glucose 125 (*)     BUN 36 (*)     Creatinine 1.7 (*)     Total Protein 2.5 (*)     Albumin 1.0 (*)     Total Bilirubin 13.9 (*)     Alkaline Phosphatase 279 (*)      (*)      (*)     eGFR 42.6 (*)     Anion Gap 24 (*)     All other components within normal limits    Narrative:     Release to patient->Immediate   PROTIME-INR - Abnormal; Notable for the following components:    Prothrombin Time 24.7 (*)     INR 2.4 (*)     All other components within normal limits    Narrative:     Release to patient->Immediate   TROPONIN I - Abnormal; Notable for the following components:    Troponin I 0.110 (*)     All other components within normal limits    Narrative:     Release to patient->Immediate   B-TYPE NATRIURETIC PEPTIDE - Abnormal; Notable for the following components:     (*)     All other components within normal limits    Narrative:     Release to  patient->Immediate   AMMONIA - Abnormal; Notable for the following components:    Ammonia 458 (*)     All other components within normal limits    Narrative:     Release to patient->Immediate   SALICYLATE LEVEL - Abnormal; Notable for the following components:    Salicylate Lvl <5.0 (*)     All other components within normal limits    Narrative:     Release to patient->Immediate   ACETAMINOPHEN LEVEL - Abnormal; Notable for the following components:    Acetaminophen (Tylenol), Serum <3.0 (*)     All other components within normal limits    Narrative:     Release to patient->Immediate   CBC W/ AUTO DIFFERENTIAL - Abnormal; Notable for the following components:    WBC 21.88 (*)     RBC 1.09 (*)     Hemoglobin 3.4 (*)     Hematocrit 11.6 (*)      (*)     MCH 31.2 (*)     MCHC 29.3 (*)     RDW 29.2 (*)     All other components within normal limits    Narrative:     Juanita roa RN acknowledged and accepted results on test(s) H&H via   secure chat.  by MAB3 12/28/2023 13:00   CALCIUM, IONIZED - Abnormal; Notable for the following components:    Ionized Calcium 1.03 (*)     All other components within normal limits   TROPONIN I - Abnormal; Notable for the following components:    Troponin I 0.606 (*)     All other components within normal limits   PROTIME-INR - Abnormal; Notable for the following components:    Prothrombin Time 32.9 (*)     INR 3.2 (*)     All other components within normal limits   APTT - Abnormal; Notable for the following components:    aPTT >150.0 (*)     All other components within normal limits    Narrative:     APTT    critical result(s) called and verbal readback obtained from   LANI SANTO RN by MRV1 12/28/2023 14:26   BILIRUBIN, DIRECT - Abnormal; Notable for the following components:    Bilirubin, Direct 8.0 (*)     All other components within normal limits   LACTATE DEHYDROGENASE - Abnormal; Notable for the following components:    LD 5,325 (*)     All other components within  normal limits   FIBRINOGEN - Abnormal; Notable for the following components:    Fibrinogen <70 (*)     All other components within normal limits    Narrative:     FIBRINOGEN    critical result(s) called and verbal readback obtained   from LANI SANTO RN by MRV1 12/28/2023 14:25   ISTAT PROCEDURE - Abnormal; Notable for the following components:    POC Glucose 165 (*)     POC BUN 42 (*)     POC Creatinine 2.0 (*)     POC TCO2 (MEASURED) 11 (*)     POC Hematocrit 27 (*)     All other components within normal limits   ISTAT PROCEDURE - Abnormal; Notable for the following components:    POC PH 6.941 (*)     POC PCO2 33.8 (*)     POC PO2 30 (*)     POC HCO3 7.3 (*)     POC BE -25 (*)     POC TCO2 8 (*)     All other components within normal limits   ISTAT PROCEDURE - Abnormal; Notable for the following components:    POC PH 6.733 (*)     POC PCO2 58.7 (*)     POC PO2 66 (*)     POC HCO3 7.8 (*)     POC BE -28 (*)     POC TCO2 10 (*)     All other components within normal limits   ISTAT LACTATE - Abnormal; Notable for the following components:    POC Lactate 18.14 (*)     All other components within normal limits   ISTAT LACTATE - Abnormal; Notable for the following components:    POC Lactate 18.97 (*)     All other components within normal limits   ISTAT PROCEDURE - Abnormal; Notable for the following components:    POC PH 6.798 (*)     POC PCO2 33.8 (*)     POC PO2 65 (*)     POC HCO3 5.2 (*)     POC BE -29 (*)     POC TCO2 6 (*)     All other components within normal limits   CULTURE, BLOOD   CULTURE, BLOOD   HIV 1 / 2 ANTIBODY    Narrative:     Release to patient->Immediate   HEPATITIS C ANTIBODY    Narrative:     Release to patient->Immediate   ALCOHOL,MEDICAL (ETHANOL)    Narrative:     Release to patient->Immediate   LIPASE   URINALYSIS, REFLEX TO URINE CULTURE   TOXICOLOGY SCREEN, URINE, RANDOM (COMPLIANCE)   LACTIC ACID, PLASMA   CBC W/ AUTO DIFFERENTIAL   BASIC METABOLIC PANEL   HAPTOGLOBIN    RETICULOCYTES   PATHOLOGIST INTERPRETATION DIFFERENTIAL   CBC W/ AUTO DIFFERENTIAL   TYPE & SCREEN   TYPE & SCREEN   PREPARE RBC SOFT   PREPARE RBC SOFT     EKG Readings: (Independently Interpreted)   Multiple EKGs were obtained, 1st EKG demonstrated right bundle alejandra block with wide QRS, rate of 115, subsequent EKG demonstrates persistent right bundle alejandra block however much more narrow rhythm compared to previously     ECG Results              EKG 12-lead (Final result)  Result time 12/28/23 11:20:00      Final result by Interface, [facility] (12/28/23 11:20:00)                   Narrative:    Test Reason : R41.89,    Vent. Rate : 109 BPM     Atrial Rate : 096 BPM     P-R Int : 000 ms          QRS Dur : 134 ms      QT Int : 434 ms       P-R-T Axes : 000 261 056 degrees     QTc Int : 584 ms    Wide QRS rhythm probably AF  Right bundle branch block  Abnormal ECG  When compared with ECG of 28-DEC-2023 10:00,  Inferolateral FRANCISCO JAVIER less  Confirmed by Carl MEDINA MD ([id number]) on 12/28/2023 11:19:56 AM    Referred By: System System           Confirmed By:Carl MEDINA MD                                     EKG 12-lead (Final result)  Result time 12/28/23 11:21:13      Final result by Interface, Lab [facility] (12/28/23 11:21:13)                   Narrative:    Test Reason : R00.0,    Vent. Rate : 115 BPM     Atrial Rate : 113 BPM     P-R Int : 000 ms          QRS Dur : 174 ms      QT Int : 456 ms       P-R-T Axes : 000 -82 053 degrees     QTc Int : 630 ms    Wide QRS rhythm  Right bundle branch block  Left anterior fascicular block  Inferolateral FRANCISCO JAVIER possible STEMI  Lateral infarct ,age undetermined  Inferior infarct ,age undetermined  Abnormal ECG  No previous ECGs available  Confirmed by Carl MEDINA MD ([id number]) on 12/28/2023 11:21:06 AM    Referred By: System System           Confirmed By:Carl MEDINA MD                                  Imaging Results              X-Ray Chest AP Portable (Final result)  Result time  12/28/23 13:42:08      Final result by Michael Beckett MD (12/28/23 13:42:08)                   Impression:      As above.      Electronically signed by: Michael Beckett MD  Date:    12/28/2023  Time:    13:42               Narrative:    EXAMINATION:  XR CHEST AP PORTABLE    CLINICAL HISTORY:  ET tube placement;    TECHNIQUE:  Single frontal view of the chest was performed.    COMPARISON:  12/28/2023    FINDINGS:  Endotracheal tube terminates in the mid trachea.  Right IJ catheter terminates over the SVC.  There is perihilar interstitial and alveolar opacification, suggestive of pulmonary edema.  No pleural effusion or pneumothorax.  Cardiac silhouette is enlarged.                                        CTA Chest Abdomen Pelvis (Final result)  Result time 12/28/23 11:59:57      Final result by Carlos Enrique Matos MD (12/28/23 11:59:57)                   Impression:      1. Intimal irregularity concerning for dissection and associated thrombus involving the lower descending thoracic aorta and entirety of the abdominal aorta.  Contour irregularity of the upper abdominal aorta concerning for unstable penetrating aortic ulcer.  2. Nonocclusive filling defects extend into the bilateral common, external, and iliac arteries as well as visualized upper femoral arteries, as described.  Additionally suspected extension of internal injury and/or thrombus of the proximal left renal artery.  3. Additionally noted is acute appearing bilateral pulmonary emboli involving lobar, inter lobar, segmental, and subsegmental branches.  Question minor flattening of the interventricular septum, as may be seen in setting of right heart strain.  4. Airspace consolidation in the lower lobes, particular on the right, may indicate aspiration in the given context.  5. Question pneumatosis of small bowel colonic loops predominantly involving the right hemiabdomen nonspecific, but may be seen in setting of ischemia.  6. Additional details, as  provided in the body of report.  This report was flagged in Epic as abnormal.    Prelim results relayed via secure chat to Dr. Cohen at approximately 11:37, 12/28/2023.      Electronically signed by: Carlos Enrique Matos  Date:    12/28/2023  Time:    11:59               Narrative:    EXAMINATION:  CTA CHEST ABDOMEN PELVIS    CLINICAL HISTORY:  Aortic dissection suspected;    TECHNIQUE:  Unenhanced CT of the chest, abdomen, and pelvis for by CTA phase CT of the chest abdomen pelvis following intravenous administration of 100 mL Omnipaque 350 contrast.    COMPARISON:  None    FINDINGS:  Findings:    VASCULATURE    Thoracic aorta: Nonaneurysmal.  Intimal irregularity and associated thrombus is noted within the descending thoracic aorta, the latter of which appears to measure up to approximately 17 mm (post-contrast series 4, image 395).    Aortic arch and brachiocephalic vessels: Normal caliber.  Patent.    Abdominal aorta and iliac arteries: As seen in the lower descending thoracic aorta, there is intimal irregularity and multifocal thrombus beginning in the suprarenal abdominal aorta extending to the bifurcation.  Additionally, wall the thoracic aorta is nonaneurysmal, there is a multilobulated abnormal contour of the abdominal aorta (for example as seen on axial series 4, image 498).  Overall appearance concerning for unstable aorta, with penetrating aortic ulcers.    Nonocclusive filling defects are noted within the bilateral common, external, and internal iliac arteries.  Thrombus is additionally suggested within the right common femoral artery and left deep femoral artery as well as likely the right superficial femoral artery.    Visceral arteries:    Celiac artery and common/proper hepatic, splenic, and left gastric arteries: Normally patent.    Superior mesenteric artery and jejunal, middle colic, and ileocolic arteries: Normally patent.    Inferior mesenteric artery and left colic and superior rectal arteries:  Normally patent.    Renal arteries: Question extension of intimal injury and associated thrombus into the proximal left renal artery (series 4, image 532).    Inferior vena cava: Normal caliber.    CHEST    Support tubes and lines: Tip of endotracheal tube appears to terminate between thoracic inlet samuel.    Heart: Appears normally sized.  There is flattening of the interventricular septum, which may be seen the setting of right heart strain.    Coronary arteries: Least mild-to-moderate atherosclerotic calcifications are noted.    Pericardium: Normal. No effusion, thickening, or calcification.    Central pulmonary arteries: Normal caliber.    Filling defects within bilateral inter lobar, lobar, and segmental/subsegmental pulmonary artery branches compatible with acute emboli.    Base of neck/thyroid: Heterogeneity of the thyroid gland.    Lymph nodes: No supraclavicular, axillary, internal mammary, mediastinal, or hilar adenopathy.    Esophagus: Normal.    Pleura: No effusion, thickening, or calcification.    Body wall: Unremarkable.    Airways: Central airways appear patent.    Lungs: Motion compromised assessment.  Question background emphysema.  Dense airspace consolidation in the right greater left lower lobes, possibly the basis of aspiration in the given context.  Additional infectious or noninfectious inflammatory etiology to be considered.  Component atelectasis likely contributes.    Bones: Unremarkable.    ABDOMEN/PELVIS    Liver: Unremarkable.    Gallbladder/bile ducts: Unremarkable. No intra or extrahepatic biliary ductal dilatation.    Pancreas: Unremarkable.    Spleen: Unremarkable.    Adrenals: Unremarkable.    Kidneys: Unremarkable.    Lymph nodes: No abdominal or pelvic lymphadenopathy.    Bowel and mesentery: No definite evidence of bowel obstruction.  Question of pneumatosis of normal caliber small bowel loops and colonic loops predominantly in the right hemiabdomen.    Free fluid or free air:  None.    Pelvis: Enlarged, fibroid uterus.    Urinary bladder: Decompressed about Jansen catheter.  Intraluminal air within the urinary bladder may relate to recent catheter placement and/or manipulation.    Body wall: Large fat containing ventral hernia.    Bones: Chest chronic appearing fracture of the right 5th and 6th ribs.                                       CT Head Without Contrast (Final result)  Result time 12/28/23 10:57:10      Final result by Mateusz Paniagua MD (12/28/23 10:57:10)                   Impression:      Head:    No acute intracranial hemorrhage.    Cervical spine:    No acute cervical fracture.  Rotation of C1 on C2 presumably positional in nature unless otherwise suspected clinically.    20 mm left lung nodule, nonspecific.  Ultrasound could be considered at some point  if clinically warranted.      Electronically signed by: Mateusz Paniagua  Date:    12/28/2023  Time:    10:57               Narrative:    EXAMINATION:  CT HEAD WITHOUT CONTRAST; CT CERVICAL SPINE WITHOUT CONTRAST    CLINICAL HISTORY:  Mental status change, unknown cause;; Neck trauma (Age >= 65y);    TECHNIQUE:  Low dose axial images were obtained through the head and cervical spine.  Coronal and sagittal reformations were also performed. Contrast was not administered.    COMPARISON:  None.    FINDINGS:  Head:    There is no evidence of intracranial hemorrhage or parenchymal contusion.    No hydrocephalus mass effect or acute territorial infarct.    Atherosclerotic vascular calcifications are prominent at the skull base.    The calvarium is intact.  The visualized sinuses and mastoid air cells are clear.    Cervical spine:    There is rotation of C1 on C2 which is presumably positional in nature.    No acute cervical fracture.    Disc bulging and endplate osteophyte formation at C4-5 through C6-7 flattens the traversing cord without overt cord compression suspected.    Developmental left cervical rib at C7.    20 mm left  thyroid nodule, nonspecific.  Please see the chest CT report for evaluation of the lung apices                                       CT Cervical Spine Without Contrast (Final result)  Result time 12/28/23 10:57:10      Final result by Mateusz Paniagua MD (12/28/23 10:57:10)                   Impression:      Head:    No acute intracranial hemorrhage.    Cervical spine:    No acute cervical fracture.  Rotation of C1 on C2 presumably positional in nature unless otherwise suspected clinically.    20 mm left lung nodule, nonspecific.  Ultrasound could be considered at some point  if clinically warranted.      Electronically signed by: Mateusz Paniagua  Date:    12/28/2023  Time:    10:57               Narrative:    EXAMINATION:  CT HEAD WITHOUT CONTRAST; CT CERVICAL SPINE WITHOUT CONTRAST    CLINICAL HISTORY:  Mental status change, unknown cause;; Neck trauma (Age >= 65y);    TECHNIQUE:  Low dose axial images were obtained through the head and cervical spine.  Coronal and sagittal reformations were also performed. Contrast was not administered.    COMPARISON:  None.    FINDINGS:  Head:    There is no evidence of intracranial hemorrhage or parenchymal contusion.    No hydrocephalus mass effect or acute territorial infarct.    Atherosclerotic vascular calcifications are prominent at the skull base.    The calvarium is intact.  The visualized sinuses and mastoid air cells are clear.    Cervical spine:    There is rotation of C1 on C2 which is presumably positional in nature.    No acute cervical fracture.    Disc bulging and endplate osteophyte formation at C4-5 through C6-7 flattens the traversing cord without overt cord compression suspected.    Developmental left cervical rib at C7.    20 mm left thyroid nodule, nonspecific.  Please see the chest CT report for evaluation of the lung apices                                       X-Ray Chest AP Portable (Final result)  Result time 12/28/23 10:15:54      Final result by  Waqas Melendez MD (12/28/23 10:15:54)                   Impression:      Intubation with tip above samuel.    Mild nonspecific consolidation in right perihilar area.  Clinical correlation and follow-up recommended.      Electronically signed by: Waqas Melendez MD  Date:    12/28/2023  Time:    10:15               Narrative:    EXAMINATION:  XR CHEST AP PORTABLE    CLINICAL HISTORY:  Encounter for other preprocedural examination    TECHNIQUE:  Single frontal view of the chest was performed.    COMPARISON:  None    FINDINGS:  Endotracheal tube is present with the tip about 3.5 cm above the samuel.  Heart size is normal.  Mediastinal structures are likely midline when allowing for rotation.  Mediastinum shows aortic atherosclerosis with calcification and tortuosity.  Lungs are expanded.  Left lung looks clear.  Right lung has mild, patchy opacification in perihilar area; this could represent pneumonia, aspiration, atelectasis, etcetera.  Granulomatous calcifications are suggested in this region also.  No significant pleural fluid or pneumothorax is detected.  The skeletal structures are intact.                                       Medications   sodium bicarbonate 150 mEq in dextrose 5 % (D5W) 1,000 mL infusion ( Intravenous New Bag 12/28/23 1102)   0.9%  NaCl infusion (for blood administration) (has no administration in time range)   sodium chloride 0.9% flush 10 mL (has no administration in time range)   chlorhexidine 0.12 % solution 15 mL (has no administration in time range)   propofol (DIPRIVAN) 10 mg/mL infusion (0 mcg/kg/min × 125 kg Intravenous Hold 12/28/23 1330)   fentaNYL 2500 mcg in 0.9% sodium chloride 250 mL infusion premix (titrating) (0 mcg/hr Intravenous Hold 12/28/23 1330)   vancomycin - pharmacy to dose (has no administration in time range)   piperacillin-tazobactam (ZOSYN) 4.5 g in dextrose 5 % in water (D5W) 100 mL IVPB (MB+) (0 g Intravenous Stopped 12/28/23 1410)   vasopressin  (PITRESSIN) 0.2 Units/mL in dextrose 5 % (D5W) 100 mL infusion (0.04 Units/min Intravenous New Bag 12/28/23 1350)   phytonadione vitamin k (AQUA-MEPHYTON) 10 mg in dextrose 5 % (D5W) 50 mL IVPB (has no administration in time range)   pantoprazole injection 40 mg (has no administration in time range)   pantoprazole injection 80 mg (has no administration in time range)   0.9%  NaCl infusion (for blood administration) (has no administration in time range)   hydrocortisone sodium succinate injection 100 mg (has no administration in time range)   NORepinephrine 32 mg in dextrose 5 % (D5W) 250 mL infusion (has no administration in time range)   NORepinephrine 32 mg in dextrose 5 % (D5W) 250 mL infusion (has no administration in time range)   etomidate (AMIDATE) 2 mg/mL injection (  Return to Cabinet 12/28/23 0930)   EPINEPHrine 0.1 mg/mL injection (1 mg Intravenous Given 12/28/23 0935)   calcium chloride 100 mg/mL (10 %) injection (1 g Intravenous Given 12/28/23 0928)   sodium bicarbonate 8.4 % (1 mEq/mL) injection (50 mEq Intravenous Given 12/28/23 0935)   EPINEPHrine (ADRENALIN) 5 mg in dextrose 5 % (D5W) 250 mL infusion (0.02 mcg/kg/min × 125 kg (Order-Specific) Intravenous Given 12/28/23 0940)   rocuronium injection 100 mg ( Intravenous Canceled Entry 12/28/23 1045)   calcium chloride 100 mg/mL (10 %) injection 1 g ( Intravenous Canceled Entry 12/28/23 1115)   sodium chloride 0.9% bolus 1,000 mL 1,000 mL (0 mLs Intravenous Stopped 12/28/23 1114)   vancomycin (VANCOCIN) 2,500 mg in dextrose 5 % (D5W) 500 mL IVPB (0 mg Intravenous Stopped 12/28/23 1340)   piperacillin-tazobactam (ZOSYN) 3.375 g in dextrose 5 % in water (D5W) 100 mL IVPB (MB+) (0 g Intravenous Stopped 12/28/23 1148)   iohexoL (OMNIPAQUE 350) injection 100 mL (100 mLs Intravenous Given 12/28/23 1048)   sodium bicarbonate 8.4 % (1 mEq/mL) injection 50 mEq (50 mEq Intravenous Given 12/28/23 1114)   lactated ringers bolus 1,000 mL (0 mLs Intravenous Stopped  12/28/23 8890)     Medical Decision Making  Patient is a 73-year-old female with unknown past medical history that presents to emergency department after a fall this morning.  Per EMS report patient called 911 today after she fell.    On initial evaluation patient is unresponsive, GCS 3.  Subsequently lost pulses and CPR was initiated.  ROSC obtained after 5 rounds. 4 rounds of epi, calcium chloride, to pushes of bicarb.  Patient was bradycardic to 35 after ROSC, started on epi drip and transcutaneous pacing.    Differential is broad:  Given fall considering sequela of trauma including intracranial hemorrhage.  Also considering metabolic encephalopathy given jaundice.  Concerning for hepatic encephalopathy.  Chest x-ray demonstrates abnormal mediastinum after intubation.  Also considering aortic pathologies such as dissection or aneurysms.  Considering pulmonary embolisms, sepsis, GI bleed.    Patient intubated after ROSC.    Workup:  CT head, CT spine, CT chest abdomen and pelvis with contrast.  VBG, lactic acid, CBC, CMP, toxicology screen, alcohol, UA, aspirin level, acetaminophen level.    Patients workup demonstrated a type B dissection, bilateral PEs, pulmonary edema.  Also demonstrating acute liver failure, severe acidosis with a pH of 6.7 and lactic acid of 18.  Additionally workup demonstrated hemoglobin of 3.8.    Consulted vascular surgery regarding the CT scans.  They will see the patient however they state nothing to do at this moment.  We had emergent release of O-negative blood which was transfused rapidly.  Started on pressors and bicarb drip.    Family was notified about likely poor outcome and likely on survival pathologies at this time.  We will admit to MICU for remainder of care.    Amount and/or Complexity of Data Reviewed  Labs: ordered.  Radiology: ordered.    Risk  Prescription drug management.  Decision regarding hospitalization.                                      Clinical  Impression:  Final diagnoses:  [Z01.818] Encounter for intubation  [R00.0] Tachycardia  [R41.89] Unresponsive  [I71.02] Dissection of abdominal aorta  [I26.99] Acute pulmonary embolism, unspecified pulmonary embolism type, unspecified whether acute cor pulmonale present  [I46.9] Cardiac arrest (Primary)  [E87.20] Metabolic acidosis  [K72.01] Acute liver failure with hepatic coma          ED Disposition Condition    Admit                 Paul Villanueva DO  Resident  12/28/23 0522

## 2023-12-28 NOTE — ASSESSMENT & PLAN NOTE
Cr 1.7 on admission. Unknown baseline. Suspect secondary to PEA arrest and undifferentiated shock. Patient require hemodialysis given profound acidosis. Trialysis line placed in ED. Nephrology consulted for urgent HD.    - Nephrology consulted for emergent HD  - Trend Cr  - Strict I&Os  - Avoid nephrotoxic agents  - Renally adjust medications

## 2023-12-28 NOTE — ASSESSMENT & PLAN NOTE
Hgb 3.4 on admission. Unclear acuity of decrease. Concern for acute bleed vs hemolysis given elevated bilirubin, jaundice appearance. S/p 2 units PRBC in ED. Will transfuse 2 additional units in MICU. Trend CBC q4h. Will check hemolysis labs.    -- Trend cbc q4h; transfuse for Hgb < 7.0  -- s/p 4 units PRBC, will likely need 1:1:1 PRBC, Plts, FFP with further transfusions  -- Follow-up hemolysis labs

## 2023-12-28 NOTE — CONSULTS
[facility] - Card[facility] Medical ICU  Nephrology  Consult Note    Patient Name: Romy Walden  MRN: [id number]  Admission Date: 12/28/2023  Hospital Length of Stay: 0 days  Attending Provider: Donn Cruz*   Primary Care Physician: No primary care provider on file.  Principal Problem:Shock    Inpatient consult to Nephrology  Consult performed by: Shayy Garcia, BREN, FNP-C  Consult ordered by: Kortney Dyson MD  Reason for consult: GIL        Subjective:     HPI: The patient is a 73 y.o. Unknown Adult with unknown past medical history who presents to ED on 12/28/2023 after EMS was called sp fall this morning. Per records, on arrival EMS found her to be alert and oriented x 4, however, she had worsening confusion en route to the hospital. On arrival, she was found to be unresponsive, hypoxic, and bradycardic ultimately sustaining PEA cardiac arrest. Per records, she obtained after 5 rounds of CPR, 4 rounds of epi, calcium chloride, bicarbonate, etc. before ROSC was achieved. She was intubated and started on Levophed. Labs in the ED were significant for WBC 21.88, Hgb 3.4, PT 24.7, INR 2.4, K 5.2, Bicarb 6, BUN 36, Cr 1.7 (unknown baseline), Anion Gap 24, , T-Bili 13.9, , , Ammonia 458, , Troponin 0.11. VBG was significant for a pH 6.79, pCO2 33.8, pO2 65, HCO3 5.2. Lactate 18.97. CTA C/A/P was significant for dissection and associated thrombus involving descending thoracic aorta and abdominal aorta, unstable penetrating abdominal aortic ulcer, bilateral PEs. She was given 2 units of pRBCs, 2L of IVF and started on Vanc and Zosyn. Nephrology consulted for anuric GIL and emergent RRT.     No past medical history on file.    No past surgical history on file.    Review of patient's allergies indicates:  Not on File  Current Facility-Administered Medications   Medication Frequency    0.9%  NaCl infusion (CRRT USE ONLY) Continuous    0.9%  NaCl infusion (for blood  administration) Q24H PRN    0.9%  NaCl infusion (for blood administration) Q24H PRN    0.9%  NaCl infusion (for blood administration) Q24H PRN    0.9%  NaCl infusion (for blood administration) Q24H PRN    chlorhexidine 0.12 % solution 15 mL BID    fentaNYL 2500 mcg in 0.9% sodium chloride 250 mL infusion premix (titrating) Continuous    hydrocortisone sodium succinate injection 100 mg Q8H    magnesium sulfate 2g in water 50mL IVPB (premix) PRN    mupirocin 2 % ointment BID    NORepinephrine 32 mg in dextrose 5 % (D5W) 250 mL infusion Continuous    NORepinephrine bitartrate-D5W 4 mg/250 mL (16 mcg/mL) infusion Soln     pantoprazole injection 40 mg BID    pantoprazole injection 80 mg Once    phytonadione vitamin k (AQUA-MEPHYTON) 10 mg in dextrose 5 % (D5W) 50 mL IVPB Once    piperacillin-tazobactam (ZOSYN) 4.5 g in dextrose 5 % in water (D5W) 100 mL IVPB (MB+) Q8H    propofol (DIPRIVAN) 10 mg/mL infusion Continuous    sodium bicarbonate 150 mEq in dextrose 5 % (D5W) 1,000 mL infusion Continuous    sodium chloride 0.9% flush 10 mL PRN    sodium phosphate 20.01 mmol in dextrose 5 % (D5W) 250 mL IVPB PRN    sodium phosphate 30 mmol in dextrose 5 % (D5W) 250 mL IVPB PRN    sodium phosphate 39.99 mmol in dextrose 5 % (D5W) 250 mL IVPB PRN    vancomycin - pharmacy to dose pharmacy to manage frequency    vasopressin (PITRESSIN) 0.2 Units/mL in dextrose 5 % (D5W) 100 mL infusion Continuous     Family History    None       Tobacco Use    Smoking status: Not on file    Smokeless tobacco: Not on file   Substance and Sexual Activity    Alcohol use: Not on file    Drug use: Not on file    Sexual activity: Not on file     Review of Systems   Unable to perform ROS: Intubated     Objective:     Vital Signs (Most Recent):  Temp: (!) 88.3 °F (31.3 °C) (12/28/23 1425)  Pulse: (!) 49 (12/28/23 1443)  Resp: (!) 36 (12/28/23 1443)  BP: (!) 104/51 (12/28/23 1443)  SpO2: (!) 91 % (12/28/23 1425) Vital Signs (24h Range):  Temp:  [88.3 °F  (31.3 °C)-94.8 °F (34.9 °C)] 88.3 °F (31.3 °C)  Pulse:  [] 49  Resp:  [7-36] 36  SpO2:  [54 %-99 %] 91 %  BP: ()/(24-69) 104/51     Weight: 124.7 kg (275 lb) (12/28/23 1434)  There is no height or weight on file to calculate BMI.  There is no height or weight on file to calculate BSA.    No intake/output data recorded.     Physical Exam  Vitals and nursing note reviewed.   Constitutional:       Appearance: Romy Walden is morbidly obese. Romy Walden is ill-appearing and toxic-appearing.      Interventions: Romy Walden is sedated and intubated.   HENT:      Head: Atraumatic.      Right Ear: External ear normal.      Left Ear: External ear normal.   Cardiovascular:      Rate and Rhythm: Normal rate and regular rhythm.      Heart sounds: Normal heart sounds.   Pulmonary:      Effort: Romy Walden is intubated.      Breath sounds: Normal breath sounds.   Abdominal:      General: There is distension.   Musculoskeletal:      Right lower leg: No edema.      Left lower leg: No edema.   Skin:     General: Skin is cool.      Coloration: Skin is jaundiced.   Neurological:      Mental Status: Romy Walden is unresponsive.      Comments: Intubated/sedated           Significant Labs:  CBC:   Recent Labs   Lab 12/28/23  1147   WBC 21.88*   RBC 1.09*   HGB 3.4*   HCT 11.6*      *   MCH 31.2*   MCHC 29.3*     CMP:   Recent Labs   Lab 12/28/23  1051   *   CALCIUM 9.5   ALBUMIN 1.0*   PROT 2.5*      K 5.2*   CO2 6*      BUN 36*   CREATININE 1.7*   ALKPHOS 279*   *   *   BILITOT 13.9*     All labs within the past 24 hours have been reviewed.    Assessment/Plan:     Cardiac/Vascular  * Shock  - defer to primary team     Dissection of descending thoracic aorta  - defer to primary team     PEA (Pulseless electrical activity)  - defer to primary team     Renal/  GIL (acute kidney injury)  73 y.o. Unknown Adult presents to ED on 12/28/2023 with  descending aortic dissection and bilateral PEs. EMS initially called sp fall at home. The patient decompensated on arrival to the ED ultimately sustaining PEA cardiac arrest. Patient underwent 5 rounds of CPR before achieving ROSC. She was intubated and started on Levophed. Labs in the ED were significant for WBC 21.88, Hgb 3.4, PT 24.7, INR 2.4, K 5.2, Bicarb 6, BUN 36, Cr 1.7, Anion Gap 24, , T-Bili 13.9, , , Ammonia 458, , Troponin 0.11. VBG was significant for a pH 6.79, pCO2 33.8, pO2 65, HCO3 5.2. Lactate 18.97. CTA C/A/P was significant for dissection and associated thrombus involving descending thoracic aorta and abdominal aorta, unstable penetrating abdominal aortic ulcer, b/l PEs. She was also found to be in shock. Nephrology consulted for anuric GIL and emergent RRT.  Baseline unknown.     Nephrology consulted for inpatient  anuric  GIL on  unknown baseline renal function .    GIL most likely 2/2 Ischemic ATN due to hemodynamic instabilities due tot PEA arrest / shock.   Worsening renal function      Recommendations:  -Willl plan for emergent RRT for clearance and volume management depending on hemodynamic status.   -Will start SLED for metabolic clearance and volume management.  -Goal UF rate: 200 cc/hr. Adjust as tolerated with a MAP >65.    -Pre-filter NS at 200cc/hr  -Prescription and dialysate baths were reviewed and changes made according to most recent labs.   -Please STOP bicarbonate infusion once RRT is initiated.   -Mg and phosphate replacement ordered.  -Monitor labs serially and follow replacement protocols as needed.   -Renal US  -UA  -urine protein/creatinine ratio  -Urine Na  -CK  -Avoid hypotension which may worsen GIL  -Renal diet/tube feeds when not NPO    -Strict I/O's and daily weights  -Renally all dose medications to eGFR   -Avoid nephrotoxic agents wean feasible (i.e. NSAIDs, intra-arterial contrast, supra-therapeutic vancomycin levels,  etc.)      Hematology  Acute pulmonary embolism  - defer to primary team         Thank you for your consult. I will follow-up with patient. Please contact us if you have any additional questions.    Shayy Garcia DNP, FNP-C  Nephrology  [facility] - [facility]

## 2023-12-28 NOTE — SUBJECTIVE & OBJECTIVE
No past medical history on file.    No past surgical history on file.    Review of patient's allergies indicates:  Not on File    Family History    None       Tobacco Use    Smoking status: Not on file    Smokeless tobacco: Not on file   Substance and Sexual Activity    Alcohol use: Not on file    Drug use: Not on file    Sexual activity: Not on file      Review of Systems   Unable to perform ROS: Intubated   Skin:  Positive for color change.     Objective:     Vital Signs (Most Recent):  Temp: (!) 88.5 °F (31.4 °C) (12/28/23 1410)  Pulse: 62 (12/28/23 1410)  Resp: (!) 35 (12/28/23 1410)  BP: (!) 89/51 (12/28/23 1410)  SpO2: (!) 92 % (12/28/23 1410) Vital Signs (24h Range):  Temp:  [88.5 °F (31.4 °C)-94.8 °F (34.9 °C)] 88.5 °F (31.4 °C)  Pulse:  [] 62  Resp:  [7-35] 35  SpO2:  [54 %-99 %] 92 %  BP: ()/(24-69) 89/51   Weight: 125 kg (275 lb 9.2 oz)  There is no height or weight on file to calculate BMI.    No intake or output data in the 24 hours ending 12/28/23 1428       Physical Exam  Constitutional:       Appearance: Romy Walden is morbidly obese.      Interventions: Romy Walden is sedated and intubated.   HENT:      Head: Atraumatic.      Right Ear: External ear normal.      Left Ear: External ear normal.   Cardiovascular:      Rate and Rhythm: Normal rate and regular rhythm.      Pulses:           Dorsalis pedis pulses are 0 on the right side and 0 on the left side.        Posterior tibial pulses are 0 on the right side and 0 on the left side.      Heart sounds: Normal heart sounds.   Pulmonary:      Effort: Romy Walden is intubated.      Breath sounds: Normal breath sounds.   Abdominal:      General: There is distension.   Musculoskeletal:      Right lower leg: No edema.      Left lower leg: No edema.   Skin:     General: Skin is cool.      Coloration: Skin is jaundiced.   Neurological:      Mental Status: Romy Walden is unresponsive.            Vents:  Vent Mode: A/C  (12/28/23 0951)  Ventilator Initiated: Yes (12/28/23 0951)  Set Rate: 25 BPM (12/28/23 0951)  Vt Set: 480 mL (12/28/23 0951)  PEEP/CPAP: 5 cmH20 (12/28/23 0951)  Oxygen Concentration (%): 80 (12/28/23 1410)  Peak Airway Pressure: 23 cmH20 (12/28/23 0951)  Plateau Pressure: 0 cmH20 (12/28/23 0951)  Total Ve: 12.6 L/m (12/28/23 0951)  Negative Inspiratory Force (cm H2O): 0 (12/28/23 0951)  Lines/Drains/Airways       Central Venous Catheter Line  Duration             Trialysis (Dialysis) Catheter 12/28/23 1345 right internal jugular <1 day              Drain  Duration                  Urethral Catheter 12/28/23 1041 Temperature probe 16 Fr. <1 day              Airway  Duration                  Airway - Non-Surgical 12/28/23 0947 <1 day              Intraosseous Line  Duration                  Intraosseous Line 12/28/23 0932 Tibia <1 day              Peripheral Intravenous Line  Duration                  Peripheral IV - Single Lumen 12/28/23 0920 18 G Right Antecubital <1 day         Peripheral IV - Single Lumen 12/28/23 1041 20 G;1 3/4 in Anterior;Left Forearm <1 day                  Significant Labs:    CBC/Anemia Profile:  Recent Labs   Lab 12/28/23  0937 12/28/23  1051 12/28/23  1147   WBC  --  11.86 21.88*   HGB  --  3.1* 3.4*   HCT 27* 10.6* 11.6*   PLT  --  234 288   MCV  --  103* 106*   RDW  --  29.2* 29.2*        Chemistries:  Recent Labs   Lab 12/28/23  1051      K 5.2*      CO2 6*   BUN 36*   CREATININE 1.7*   CALCIUM 9.5   ALBUMIN 1.0*   PROT 2.5*   BILITOT 13.9*   ALKPHOS 279*   *   *       All pertinent labs within the past 24 hours have been reviewed.    Significant Imaging: I have reviewed all pertinent imaging results/findings within the past 24 hours.

## 2023-12-28 NOTE — H&P
[facility] - Cardiac Medical ICU  Critical Care Medicine  History & Physical    Patient Name: Romy Walden  MRN: [id number]  Admission Date: 12/28/2023  Hospital Length of Stay: 0 days  Code Status: Full Code  Attending Physician: Donn Cruz*   Primary Care Provider: No primary care provider on file.   Principal Problem: Shock    Subjective:     HPI:  72 y/o F with an unknown past medical history who presented via EMS to the ED initially for encephalopathy. History is limited due to acuity of the condition. Per ED, patient had a fall earlier today which EMS was called for. She was initially A&Ox4 but became more altered during transport before becoming unresponsive.   Upon arrival to the ED, patient became bradycardiac in the 30s and went into PEA cardiac arrest and respiratory failure. Patient underwent 5 rounds of CPR before achieving ROSC. She was intubated and started on Levophed. Labs in the ED were significant for WBC 21.88, Hgb 3.4, PT 24.7, INR 2.4, K 5.2, Bicarb 6, BUN 36, Cr 1.7, Anion Gap 24, , T-Bili 13.9, , , Ammonia 458, , Troponin 0.11. VBG was significant for a pH 6.79, pCO2 33.8, pO2 65, HCO3 5.2. Lactate 18.97. CTA C/A/P was significant for dissection and associated thrombus involving descending thoracic aorta and abdominal aorta, unstable penetrating abdominal aortic ulcer, b/l PE with concern of right heart strain, consolidation in b/l lower lobes, possible pneumatosis of small bowel colonic loops in right hemiabdomen. She was given 2 units of pRBCs, 2L of IVF and started on Vanc and Zosyn. On my exam, patient was visibly jaundice. Per phone call with nephew, patient was reportedly feeling ill for the past week and was urged by her neighbor to see a doctor but refused.     Critical care was consulted for cardiac arrest and acute hypoxic respiratory failure.     Hospital/ICU Course:  No notes on file     No past medical history on file.    No past  surgical history on file.    Review of patient's allergies indicates:  Not on File    Family History    None       Tobacco Use    Smoking status: Not on file    Smokeless tobacco: Not on file   Substance and Sexual Activity    Alcohol use: Not on file    Drug use: Not on file    Sexual activity: Not on file      Review of Systems   Unable to perform ROS: Intubated   Skin:  Positive for color change.     Objective:     Vital Signs (Most Recent):  Temp: (!) 88.5 °F (31.4 °C) (12/28/23 1410)  Pulse: 62 (12/28/23 1410)  Resp: (!) 35 (12/28/23 1410)  BP: (!) 89/51 (12/28/23 1410)  SpO2: (!) 92 % (12/28/23 1410) Vital Signs (24h Range):  Temp:  [88.5 °F (31.4 °C)-94.8 °F (34.9 °C)] 88.5 °F (31.4 °C)  Pulse:  [] 62  Resp:  [7-35] 35  SpO2:  [54 %-99 %] 92 %  BP: ()/(24-69) 89/51   Weight: 125 kg (275 lb 9.2 oz)  There is no height or weight on file to calculate BMI.    No intake or output data in the 24 hours ending 12/28/23 1428       Physical Exam  Constitutional:       Appearance: Romy Walden is morbidly obese.      Interventions: Romy Walden is sedated and intubated.   HENT:      Head: Atraumatic.      Right Ear: External ear normal.      Left Ear: External ear normal.   Cardiovascular:      Rate and Rhythm: Normal rate and regular rhythm.      Pulses:           Dorsalis pedis pulses are 0 on the right side and 0 on the left side.        Posterior tibial pulses are 0 on the right side and 0 on the left side.      Heart sounds: Normal heart sounds.   Pulmonary:      Effort: Romy Walden is intubated.      Breath sounds: Normal breath sounds.   Abdominal:      General: There is distension.   Musculoskeletal:      Right lower leg: No edema.      Left lower leg: No edema.   Skin:     General: Skin is cool.      Coloration: Skin is jaundiced.   Neurological:      Mental Status: Romy Walden is unresponsive.            Vents:  Vent Mode: A/C (12/28/23 2486)  Ventilator Initiated: Yes  (12/28/23 0951)  Set Rate: 25 BPM (12/28/23 0951)  Vt Set: 480 mL (12/28/23 0951)  PEEP/CPAP: 5 cmH20 (12/28/23 0951)  Oxygen Concentration (%): 80 (12/28/23 1410)  Peak Airway Pressure: 23 cmH20 (12/28/23 0951)  Plateau Pressure: 0 cmH20 (12/28/23 0951)  Total Ve: 12.6 L/m (12/28/23 0951)  Negative Inspiratory Force (cm H2O): 0 (12/28/23 0951)  Lines/Drains/Airways       Central Venous Catheter Line  Duration             Trialysis (Dialysis) Catheter 12/28/23 1345 right internal jugular <1 day              Drain  Duration                  Urethral Catheter 12/28/23 1041 Temperature probe 16 Fr. <1 day              Airway  Duration                  Airway - Non-Surgical 12/28/23 0947 <1 day              Intraosseous Line  Duration                  Intraosseous Line 12/28/23 0932 Tibia <1 day              Peripheral Intravenous Line  Duration                  Peripheral IV - Single Lumen 12/28/23 0920 18 G Right Antecubital <1 day         Peripheral IV - Single Lumen 12/28/23 1041 20 G;1 3/4 in Anterior;Left Forearm <1 day                  Significant Labs:    CBC/Anemia Profile:  Recent Labs   Lab 12/28/23  0937 12/28/23  1051 12/28/23  1147   WBC  --  11.86 21.88*   HGB  --  3.1* 3.4*   HCT 27* 10.6* 11.6*   PLT  --  234 288   MCV  --  103* 106*   RDW  --  29.2* 29.2*        Chemistries:  Recent Labs   Lab 12/28/23  1051      K 5.2*      CO2 6*   BUN 36*   CREATININE 1.7*   CALCIUM 9.5   ALBUMIN 1.0*   PROT 2.5*   BILITOT 13.9*   ALKPHOS 279*   *   *       All pertinent labs within the past 24 hours have been reviewed.    Significant Imaging: I have reviewed all pertinent imaging results/findings within the past 24 hours.  Assessment/Plan:     Cardiac/Vascular  * Shock  Undifferentiated shock on admission. Hemorrhagic vs septic vs cardiogenic vs obstructive (2/2 PE). Cold extremities on exam. Fluid resuscitated in ED with 2L IV fluids, 2 units PRBC. Pressors started. Lactate 18 on  admission. Will support broadly given unclear etiology. Broad spectrum antibiotics, vasopressor support.    -- Maintain Map goal > 65  -- IV vancomycin and zosyn  -- Follow-up stat TTE  -- IV hydrocortisone 100 mg TID    Dissection of descending thoracic aorta  CTA chest/abd/pelvis with: Intimal irregularity concerning for dissection and associated thrombus involving the lower descending thoracic aorta and entirety of the abdominal aorta.  Contour irregularity of the upper abdominal aorta concerning for unstable penetrating aortic ulcer.     Discussed with radiology. No active extravasation noted in ascending or thoracic aorta. Visualization of abdominal aorta adequate to rule out abdominal aortic bleed. Recommended no further benefit from dedicated abdominal contrast timing. Given high suspicion for hemorrhage will evaluate for possible GI bleeding if patient stabilizes enough for endoscopy.    -- Maintain SBP < 120    PEA (Pulseless electrical activity)  Patient reportedly encephalopathic, bradycardic, eventually PEA en route to hospital with EMS. CPR initiated, ROSC obtained after five rounds of compressions. Transcutaneously paced with native rhythm out-pacing pacemaker. Patient now in sinus rhythm.    -- Manage shock as above  -- Maintain electrolytes WNL    Renal/  GIL (acute kidney injury)  Cr 1.7 on admission. Unknown baseline. Suspect secondary to PEA arrest and undifferentiated shock. Patient require hemodialysis given profound acidosis. Trialysis line placed in ED. Nephrology consulted for urgent HD.    - Nephrology consulted for emergent HD  - Trend Cr  - Strict I&Os  - Avoid nephrotoxic agents  - Renally adjust medications      Metabolic acidosis  pH 6.733 at time of admission. Bicarb 6. Suspect secondary to shock and significant lactic acidosis. Likely driven by ischemia given significant clot burden as well. Bicarb gtt initiated. Will plan for emergent hemodialysis.    -- Nephrology consulted for  "emergent HD  -- Bicarb gtt  -- Trend q4h BMP  -- ABG PRN    ID  Severe sepsis  This patient does have evidence of infective focus  My overall impression is septic shock due to lactate > 4 and MAP < 65.  Source: Respiratory, Abdominal, and Unknown  Antibiotics given-   Antibiotics (72h ago, onward)      Start     Stop Route Frequency Ordered    12/28/23 1330  piperacillin-tazobactam (ZOSYN) 4.5 g in dextrose 5 % in water (D5W) 100 mL IVPB (MB+)         -- IV Every 8 hours (non-standard times) 12/28/23 1226    12/28/23 1325  vancomycin - pharmacy to dose  (vancomycin IVPB (PEDS and ADULTS))        See Hyperspace for full Linked Orders Report.    -- IV pharmacy to manage frequency 12/28/23 1226    12/28/23 1030  vancomycin (VANCOCIN) 2,500 mg in dextrose 5 % (D5W) 500 mL IVPB         12/28/23 2229 IV ED 1 Time 12/28/23 1020          Latest lactate reviewed-  No results for input(s): "LACTATE" in the last 72 hours.  Organ dysfunction indicated by Acute kidney injury, Acute liver injury, and Acute respiratory failure    Fluid challenge Ideal Body Weight- The patient's ideal body weight is Height is required to calculate ideal body weight. which will be used to calculate fluid bolus of 30 ml/kg for treatment of septic shock.      Post- resuscitation assessment No - Post resuscitation assessment not needed     Will Start Pressors- Levophed for MAP of 65  Source control achieved by: Vanc + Zosyn    Hematology  Arterial embolism and thrombosis of lower extremity  Admission CTA with: Nonocclusive filling defects extend into the bilateral common, external, and iliac arteries as well as visualized upper femoral arteries, as described.  Additionally suspected extension of internal injury and/or thrombus of the proximal left renal artery.     Unable to palpate pulses in bilateral lower extremities. Lower extremities cool to palpation. Vascular surgery consulted in ED, unable to intervene w/ mechanical thrombectomy or " anticoagulation given concurrent aortic dissection and suspected hemorrhage.    Acute pulmonary embolism  CTA chest/abd/pelvis on admission showing: acute appearing bilateral pulmonary emboli involving lobar, inter lobar, segmental, and subsegmental branches.  Question minor flattening of the interventricular septum, as may be seen in setting of right heart strain. Patient intubated in ED. Unfortunately given patient's concurrent type B aortic dissection, Hgb 3 concerning for hemorrhage the patient is not a candidate for anticoagulation. ED discussed with vascular surgery who stated risks of thrombectomy outweighed benefits. Discussed with cardiology who are in agreement with vascular surgery, given patient's contraindication to anticoagulation the patient is not a candidate for thrombectomy at this time. Will manage supportively as able.    -- Mechanical ventilation  -- Unable to anticoagulate given aortic dissection, concern for active bleed    Elevated INR  INR 2.4 -> 3.2 on admission. Given concern for active hemorrhage will treat with 1 dose of vitamin K. Risk of worsening of pulmonary emboli recognized. Risks of hemorrhage vs worsening PE considered.  Suspect worsening 2/2 acute liver failure secondary to shock    -- IV vitamin K  -- Trend INR daily    Oncology  Anemia  Hgb 3.4 on admission. Unclear acuity of decrease. Concern for acute bleed vs hemolysis given elevated bilirubin, jaundice appearance. S/p 2 units PRBC in ED. Will transfuse 2 additional units in MICU. Trend CBC q4h. Will check hemolysis labs.    -- Trend cbc q4h; transfuse for Hgb < 7.0  -- s/p 4 units PRBC, will likely need 1:1:1 PRBC, Plts, FFP with further transfusions  -- Follow-up hemolysis labs    GI  Transaminitis  Unclear whether transaminitis present before admission or secondary to shock. Abdominal imaging without clear hepatic pathology. Will fractionate bilirubin to determine liver/heme origin.  Lab Results   Component Value Date      (H) 12/28/2023     (H) 12/28/2023    ALKPHOS 279 (H) 12/28/2023    BILITOT 13.9 (H) 12/28/2023     -- See 'shock'  -- Trend aminotransferases  -- Follow-up direct bili        Critical Care Daily Checklist:    A: Awake: RASS Goal/Actual Goal:    Actual:     B: Spontaneous Breathing Trial Performed?     C: SAT & SBT Coordinated?  N/A                      D: Delirium: CAM-ICU     E: Early Mobility Performed? No   F: Feeding Goal:    Status:     Current Diet Order   Procedures    Diet NPO      AS: Analgesia/Sedation N/A   T: Thromboembolic Prophylaxis Holding 2/2 possible bleed   H: HOB > 300 Yes   U: Stress Ulcer Prophylaxis (if needed) PPI   G: Glucose Control N/A   B: Bowel Function     I: Indwelling Catheter (Lines & Jansen) Necessity Reviewed   D: De-escalation of Antimicrobials/Pharmacotherapies Broad spectrum    Plan for the day/ETD Reviewed    Code Status:  Family/Goals of Care: Full Code  Family updated of significant illness/morbidity       Critical secondary to Patient has a condition that poses threat to life and bodily function: Undifferentiated shock    Critical care was time spent personally by me on the following activities: development of treatment plan with patient or surrogate and bedside caregivers, discussions with consultants, evaluation of patient's response to treatment, examination of patient, ordering and performing treatments and interventions, ordering and review of laboratory studies, ordering and review of radiographic studies, pulse oximetry, re-evaluation of patient's condition. This critical care time did not overlap with that of any other provider or involve time for any procedures.     Khanh Rodriguez MD  Critical Care Medicine  [facility] - Cardiac Medical ICU

## 2023-12-28 NOTE — HPI
72 y/o F with an unknown past medical history who presented via EMS to the ED initially for encephalopathy. History is limited due to acuity of the condition. Per ED, patient had a fall earlier today which EMS was called for. She was initially A&Ox4 but became more altered during transport before becoming unresponsive.   Upon arrival to the ED, patient became bradycardiac in the 30s and went into PEA cardiac arrest and respiratory failure. Patient underwent 5 rounds of CPR before achieving ROSC. She was intubated and started on Levophed. Labs in the ED were significant for WBC 21.88, Hgb 3.4, PT 24.7, INR 2.4, K 5.2, Bicarb 6, BUN 36, Cr 1.7, Anion Gap 24, , T-Bili 13.9, , , Ammonia 458, , Troponin 0.11. VBG was significant for a pH 6.79, pCO2 33.8, pO2 65, HCO3 5.2. Lactate 18.97. CTA C/A/P was significant for dissection and associated thrombus involving descending thoracic aorta and abdominal aorta, unstable penetrating abdominal aortic ulcer, b/l PE with concern of right heart strain, consolidation in b/l lower lobes, possible pneumatosis of small bowel colonic loops in right hemiabdomen. She was given 2 units of pRBCs, 2L of IVF and started on Vanc and Zosyn. On my exam, patient was visibly jaundice. Per phone call with nephew, patient was reportedly feeling ill for the past week and was urged by her neighbor to see a doctor but refused.     Critical care was consulted for cardiac arrest and acute hypoxic respiratory failure.

## 2023-12-28 NOTE — ED TRIAGE NOTES
Patient arrives via EMS with reports of AMS. Per EMS patient reports a fall today was AAOX4 with EMS but shortly became altered only responsive to pain. Patient unresponsive on arrival and jaundice.

## 2023-12-28 NOTE — ASSESSMENT & PLAN NOTE
Unclear whether transaminitis present before admission or secondary to shock. Abdominal imaging without clear hepatic pathology. Will fractionate bilirubin to determine liver/heme origin.  Lab Results   Component Value Date     (H) 12/28/2023     (H) 12/28/2023    ALKPHOS 279 (H) 12/28/2023    BILITOT 13.9 (H) 12/28/2023     -- See 'shock'  -- Trend aminotransferases  -- Follow-up direct bili

## 2023-12-28 NOTE — ASSESSMENT & PLAN NOTE
73 y.o. Unknown Adult presents to ED on 12/28/2023 with descending aortic dissection and bilateral PEs. EMS initially called sp fall at home. The patient decompensated on arrival to the ED ultimately sustaining PEA cardiac arrest. Patient underwent 5 rounds of CPR before achieving ROSC. She was intubated and started on Levophed. Labs in the ED were significant for WBC 21.88, Hgb 3.4, PT 24.7, INR 2.4, K 5.2, Bicarb 6, BUN 36, Cr 1.7, Anion Gap 24, , T-Bili 13.9, , , Ammonia 458, , Troponin 0.11. VBG was significant for a pH 6.79, pCO2 33.8, pO2 65, HCO3 5.2. Lactate 18.97. CTA C/A/P was significant for dissection and associated thrombus involving descending thoracic aorta and abdominal aorta, unstable penetrating abdominal aortic ulcer, b/l PEs. She was also found to be in shock. Nephrology consulted for anuric GIL and emergent RRT.  Baseline unknown.     Nephrology consulted for inpatient  anuric  GIL on  unknown baseline renal function .    GIL most likely 2/2 Ischemic ATN due to hemodynamic instabilities due tot PEA arrest / shock.   Worsening renal function      Recommendations:  -Willl plan for emergent RRT for clearance and volume management depending on hemodynamic status.   -Will start SLED for metabolic clearance and volume management.  -Goal UF rate: 200 cc/hr. Adjust as tolerated with a MAP >65.    -Pre-filter NS at 200cc/hr  -Prescription and dialysate baths were reviewed and changes made according to most recent labs.   -Please STOP bicarbonate infusion once RRT is initiated.   -Mg and phosphate replacement ordered.  -Monitor labs serially and follow replacement protocols as needed.   -Renal US  -UA  -urine protein/creatinine ratio  -Urine Na  -CK  -Avoid hypotension which may worsen GIL  -Renal diet/tube feeds when not NPO    -Strict I/O's and daily weights  -Renally all dose medications to eGFR   -Avoid nephrotoxic agents wean feasible (i.e. NSAIDs, intra-arterial contrast,  supra-therapeutic vancomycin levels, etc.)

## 2023-12-28 NOTE — PLAN OF CARE
[facility] - Emergency Dept  Initial Discharge Assessment       Primary Care Provider: No primary care provider on file.    Admission Diagnosis: Encounter for intubation [Z01.818]    Admission Date: 12/28/2023  Expected Discharge Date:          Payor: /     Extended Emergency Contact Information  Primary Emergency Contact: Whitney Saldivar  Mobile Phone: [phone]  Relation: Friend    Discharge Plan A: (P) Other (tbd)       No Pharmacies Listed    Initial Assessment (most recent)       Adult Discharge Assessment - 12/28/23 1418          Discharge Assessment    Assessment Type Discharge Planning Assessment (P)      Confirmed/corrected address, phone number and insurance No (P)      Reason No family to provide information/patient unable to answer (P)      Source of Information health record (P)      Communicated CAMILO with patient/caregiver No (P)      Discharge Plan A Other (P)    tbd       Physical Activity    On average, how many days per week do you engage in moderate to strenuous exercise (like a brisk walk)? Patient unable to answer (P)      On average, how many minutes do you engage in exercise at this level? Patient unable to answer (P)         Financial Resource Strain    How hard is it for you to pay for the very basics like food, housing, medical care, and heating? Patient unable to answer (P)         Housing Stability    In the last 12 months, was there a time when you were not able to pay the mortgage or rent on time? Patient unable to answer (P)      In the last 12 months, was there a time when you did not have a steady place to sleep or slept in a shelter (including now)? Patient unable to answer (P)         Transportation Needs    In the past 12 months, has lack of transportation kept you from medical appointments or from getting medications? Patient unable to answer (P)      In the past 12 months, has lack of transportation kept you from meetings, work, or from getting things needed for daily living? Patient  unable to answer (P)         Food Insecurity    Within the past 12 months, you worried that your food would run out before you got the money to buy more. Patient unable to answer (P)      Within the past 12 months, the food you bought just didn't last and you didn't have money to get more. Patient unable to answer (P)         Stress    Do you feel stress - tense, restless, nervous, or anxious, or unable to sleep at night because your mind is troubled all the time - these days? Patient unable to answer (P)         Social Connections    In a typical week, how many times do you talk on the phone with family, friends, or neighbors? Patient unable to answer (P)      How often do you get together with friends or relatives? Patient unable to answer (P)      How often do you attend Sikh or Mosque services? Patient unable to answer (P)      Do you belong to any clubs or organizations such as Sikh groups, unions, fraternal or athletic groups, or school groups? Patient unable to answer (P)      How often do you attend meetings of the clubs or organizations you belong to? Patient unable to answer (P)      Are you , , , , never , or living with a partner? Patient unable to answer (P)         Alcohol Use    Q1: How often do you have a drink containing alcohol? Patient unable to answer (P)      Q2: How many drinks containing alcohol do you have on a typical day when you are drinking? Patient unable to answer (P)      Q3: How often do you have six or more drinks on one occasion? Patient unable to answer (P)

## 2023-12-28 NOTE — HPI
The patient is a 73 y.o. Unknown Adult with unknown past medical history who presents to ED on 12/28/2023 after EMS was called sp fall this morning. Per records, on arrival EMS found her to be alert and oriented x 4, however, she had worsening confusion en route to the hospital. On arrival, she was found to be unresponsive, hypoxic, and bradycardic ultimately sustaining PEA cardiac arrest. Per records, she obtained after 5 rounds of CPR, 4 rounds of epi, calcium chloride, bicarbonate, etc. before ROSC was achieved. She was intubated and started on Levophed. Labs in the ED were significant for WBC 21.88, Hgb 3.4, PT 24.7, INR 2.4, K 5.2, Bicarb 6, BUN 36, Cr 1.7 (unknown baseline), Anion Gap 24, , T-Bili 13.9, , , Ammonia 458, , Troponin 0.11. VBG was significant for a pH 6.79, pCO2 33.8, pO2 65, HCO3 5.2. Lactate 18.97. CTA C/A/P was significant for dissection and associated thrombus involving descending thoracic aorta and abdominal aorta, unstable penetrating abdominal aortic ulcer, bilateral PEs. She was given 2 units of pRBCs, 2L of IVF and started on Vanc and Zosyn. Nephrology consulted for anuric GIL and emergent RRT.

## 2023-12-28 NOTE — ASSESSMENT & PLAN NOTE
Admission CTA with: Nonocclusive filling defects extend into the bilateral common, external, and iliac arteries as well as visualized upper femoral arteries, as described.  Additionally suspected extension of internal injury and/or thrombus of the proximal left renal artery.     Unable to palpate pulses in bilateral lower extremities. Lower extremities cool to palpation. Vascular surgery consulted in ED, unable to intervene w/ mechanical thrombectomy or anticoagulation given concurrent aortic dissection and suspected hemorrhage.

## 2023-12-28 NOTE — ASSESSMENT & PLAN NOTE
73 F with unknown PMH presented after fall/found down with thrombus in lower descending thoracic and abdominal aorta, BL Pulmonary Emboli, severe metabolic acidosis pH 6.7, GIL and anemia with Hgb 3.    - Patient in grave condition, not candidate for any vascular surgery intervention.

## 2023-12-29 LAB
ALLENS TEST: ABNORMAL
DELSYS: ABNORMAL
ERYTHROCYTE [SEDIMENTATION RATE] IN BLOOD BY WESTERGREN METHOD: 35 MM/H
FIO2: 80
HCO3 UR-SCNC: 5.1 MMOL/L (ref 24–28)
HCT VFR BLD CALC: <15 %PCV (ref 36–54)
MIN VOL: 17
MODE: ABNORMAL
PCO2 BLDA: 26.8 MMHG (ref 35–45)
PH SMN: 6.89 [PH] (ref 7.35–7.45)
PIP: 23
PO2 BLDA: 237 MMHG (ref 80–100)
POC BE: -28 MMOL/L
POC IONIZED CALCIUM: 0.82 MMOL/L (ref 1.06–1.42)
POC SATURATED O2: 99 % (ref 95–100)
POC TCO2: 6 MMOL/L (ref 23–27)
POTASSIUM BLD-SCNC: 6.1 MMOL/L (ref 3.5–5.1)
SAMPLE: ABNORMAL
SITE: ABNORMAL
SODIUM BLD-SCNC: 121 MMOL/L (ref 136–145)
SP02: 58
VT: 480

## 2023-12-29 NOTE — DISCHARGE SUMMARY
Death Note  Critical Care Medicine      Admit Date: 2023    Date of Death: 2023    Time of Death:     Attending Physician: Donn Cruz*    Principal Diagnoses: PEA (Pulseless electrical activity)    Preliminary Cause of Death: PEA (Pulseless electrical activity)    Secondary Diagnoses:   Active Hospital Problems    Diagnosis  POA    *PEA (Pulseless electrical activity) [I46.9]  Yes    Shock [R57.9]  Yes    Severe sepsis [A41.9, R65.20]  Yes    Anemia [D64.9]  Yes    Metabolic acidosis [E87.20]  Yes    Consumption coagulopathy [D65]  Yes    Acute renal failure with tubular necrosis [N17.0]  Yes    Transaminitis [R74.01]  Yes    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        Discharged Condition:     HPI:  72 y/o F with an unknown past medical history who presented via EMS to the ED initially for encephalopathy. History is limited due to acuity of the condition. Per ED, patient had a fall earlier today which EMS was called for. She was initially A&Ox4 but became more altered during transport before becoming unresponsive.   Upon arrival to the ED, patient became bradycardiac in the 30s and went into PEA cardiac arrest and respiratory failure. Patient underwent 5 rounds of CPR before achieving ROSC. She was intubated and started on Levophed. Labs in the ED were significant for WBC 21.88, Hgb 3.4, PT 24.7, INR 2.4, K 5.2, Bicarb 6, BUN 36, Cr 1.7, Anion Gap 24, , T-Bili 13.9, , , Ammonia 458, , Troponin 0.11. VBG was significant for a pH 6.79, pCO2 33.8, pO2 65, HCO3 5.2. Lactate 18.97. CTA C/A/P was significant for dissection and associated thrombus involving  descending thoracic aorta and abdominal aorta, unstable penetrating abdominal aortic ulcer, b/l PE with concern of right heart strain, consolidation in b/l lower lobes, possible pneumatosis of small bowel colonic loops in right hemiabdomen. She was given 2 units of pRBCs, 2L of IVF and started on Vanc and Zosyn. On my exam, patient was visibly jaundice. Per phone call with nephew, patient was reportedly feeling ill for the past week and was urged by her neighbor to see a doctor but refused.     Critical care was consulted for cardiac arrest and acute hypoxic respiratory failure.     Hospital/ICU Course:  No notes on file        Consultations were held with the family regarding the patient's expected poor prognosis. At the direction of the family, the vasopressors were turned off and measures to ensure the comfort of the patient including, but not limited to, morphine as needed for pain and air hunger as well as benzodiazepines as needed for agitation were ordered. The patient was subsequently declared dead. Condolences offered.     [facility]EL [facility]  Critical Care Medicine  12/28/2023 7:32 PM

## 2023-12-29 NOTE — CONSULTS
[facility] - Card[facility] Medical [facility]  Vascular Surgery  Consult Note    Inpatient consult to Vascular Surgery  Consult performed by: Skyler Mera MD  Consult ordered by: Latricia Cohen MD        Subjective:     Chief Complaint/Reason for Admission: Fall    History of Present Illness: 73 F who is unknown to the [facility] and unresponsive. History is limited due to acuity of the condition. Per chart documentation and ED, patient had a fall earlier today which EMS was called for. She was initially A&Ox4 but became more altered during transport before becoming unresponsive. Upon arrival to the ED, patient became bradycardiac in the 30s and went into PEA cardiac arrest and respiratory failure. Patient underwent 5 rounds of CPR before achieving ROSC. She was intubated. Currently she is on Epinephrine, Norepineprine and Phenylephrine iv infusion. He last Hb was 4.6, pH  6.7.     CTA Abdomen & Pelvis at 10:48 today revealed thrombus in lower descending thoracic aorta and entirety of the abdominal aorta.  Contour irregularity of the upper abdominal aorta concerning for unstable penetrating aortic ulcer. Nonocclusive filling defects extend into the bilateral common, external, and iliac arteries as well as visualized upper femoral arteries. Additionally suspected extension of internal injury and/or thrombus of the proximal left renal artery. Also, acute bilateral pulmonary emboli involving lobar, inter lobar, segmental, and subsegmental branches. Vascular surgery has been consulted for the thrombus in lower descending thoracic and abdominal aorta.          (Not in a hospital admission)      Review of patient's allergies indicates:  Not on File    No past medical history on file.  No past surgical history on file.  Family History    None       Tobacco Use    Smoking status: Not on file    Smokeless tobacco: Not on file   Substance and Sexual Activity    Alcohol use: Not on file    Drug use: Not on file    Sexual  activity: Not on file     Review of Systems   Unable to perform ROS: Intubated (Intubated)     Objective:     Vital Signs (Most Recent):  Temp: (!) 88.5 °F (31.4 °C) (12/28/23 1410)  Pulse: 62 (12/28/23 1410)  Resp: (!) 35 (12/28/23 1410)  BP: (!) 89/51 (12/28/23 1410)  SpO2: (!) 92 % (12/28/23 1410) Vital Signs (24h Range):  Temp:  [88.5 °F (31.4 °C)-94.8 °F (34.9 °C)] 88.5 °F (31.4 °C)  Pulse:  [] 62  Resp:  [7-35] 35  SpO2:  [54 %-99 %] 92 %  BP: ()/(24-69) 89/51     Weight: 125 kg (275 lb 9.2 oz)  There is no height or weight on file to calculate BMI.      Physical Exam  Constitutional:       Appearance: Romy Walden is ill-appearing and toxic-appearing.      Comments: Intubated  Sedated  Not fighting the tube   HENT:      Head: Normocephalic.      Mouth/Throat:      Comments: Endotracheal tube  Eyes:      Comments: closed   Cardiovascular:      Rate and Rhythm: Bradycardia present.      Pulses:           Femoral pulses are 2+ on the right side and 2+ on the left side.  Pulmonary:      Comments: Equal chest rise with ventilation   Skin:     Capillary Refill: Capillary refill takes more than 3 seconds.      Coloration: Skin is jaundiced and pale.      Comments: Jaundice +   Neurological:      Comments: sedated          Significant Labs:  All pertinent labs from the last 24 hours have been reviewed.    Significant Diagnostics:  I have reviewed all pertinent imaging results/findings within the past 24 hours.    Assessment/Plan:     Dissection of descending thoracic aorta  73 F with unknown PMH presented after fall/found down with thrombus in lower descending thoracic and abdominal aorta, BL Pulmonary Emboli, severe metabolic acidosis pH 6.7, GIL and anemia with Hgb 3.    - Patient in grave condition, not candidate for any vascular surgery intervention.   - Palliative.             Thank you for your consult. I will sign off. Please contact us if you have any additional questions.    kSyler Mera,  MD  Vascular Surgery  [facility]

## 2023-12-29 NOTE — PROGRESS NOTES
Patient unable to tolerate dialysis at this time due to hypotension and unstable vitals despite vasopressor support.  Dr. Osuna with nephrology and primary team aware of holding off on starting dialysis at this time. MD discussed with family and ok to hold off on treatment.

## 2023-12-29 NOTE — SIGNIFICANT EVENT
Death Note     Called to bedside by patient's nurse. Nursing supervisor notified. Family at bedside.  has been called and is also at bedside.     Patient is not responding to verbal or tactile stimuli.No heart or breath sounds on auscultation. No respirations. No palpable pulses.      Time of death: 12/28/2023 1930  Cause of Death: PEA arrest 2/2 multifactorial shock, acute hypoxic respiratory failure in the setting of NSTEMI, penetrating abdominal aortic ulcer, and bilateral PE       -------------------------------------------------------------------------------------------------------------------  Electronically signed by:     MD [email or web address]  [phone]  Department of Internal Medicine  [facility]

## 2023-12-29 NOTE — SIGNIFICANT EVENT
Discussed plan of care with friends at bedside who called Philip (nephew). I then called Philip, who is still on his way. All parties felt that the current care plan was prolonging the dying process and causing unnecessary suffering for the patient and that she would not want this.      called to bedside for family support. After  visits, will turn off vasopressors and transition to comfort care measures only. All parties updated on POC and in agreement.     Bedside nurse aware of current POC.     YURIY Dyson MD  [facility]

## 2023-12-29 NOTE — NURSING
Upon arrival to shift,  at bedside with pt and family. Family decided to withdraw care. Comfort measures only order placed.

## 2023-12-29 NOTE — PLAN OF CARE
Pt arrived for ED with Bicarb, Levo and Vaso gtt infusing. A-Line was placed. 2 units PRBCs, 1 unit FFP and 1 unit Platelets given. Levo quickly titrated to max dose with Epi being added. Shortly after Epi met max dose. Fellow reached out to Philip ([email or web address]) to discuss GOC. Decision was made to make Pt an DNR and to cont the max dose infusions of all 2 pressors, until Pt passes. Allison is headed in from [place]. Will cont to monitor.  called for family that is in room.

## 2023-12-30 LAB
BLD PROD TYP BPU: NORMAL
BLD PROD TYP BPU: NORMAL
BLOOD UNIT EXPIRATION DATE: NORMAL
BLOOD UNIT EXPIRATION DATE: NORMAL
BLOOD UNIT TYPE CODE: 6200
BLOOD UNIT TYPE CODE: 6200
BLOOD UNIT TYPE: NORMAL
BLOOD UNIT TYPE: NORMAL
CODING SYSTEM: NORMAL
CODING SYSTEM: NORMAL
CROSSMATCH INTERPRETATION: NORMAL
CROSSMATCH INTERPRETATION: NORMAL
DISPENSE STATUS: NORMAL
DISPENSE STATUS: NORMAL
NUM UNITS TRANS FFP: NORMAL
NUM UNITS TRANS FFP: NORMAL

## 2023-12-31 LAB
EST. AVERAGE GLUCOSE BLD GHB EST-MCNC: 82 MG/DL
HBA1C MFR BLD: 4.5 %

## 2024-01-02 PROBLEM — I74.10 AORTIC THROMBUS: Status: ACTIVE | Noted: 2024-01-02

## 2024-01-02 LAB
BACTERIA BLD CULT: NORMAL
BACTERIA BLD CULT: NORMAL